# Patient Record
Sex: FEMALE | Race: WHITE | NOT HISPANIC OR LATINO | Employment: FULL TIME | URBAN - METROPOLITAN AREA
[De-identification: names, ages, dates, MRNs, and addresses within clinical notes are randomized per-mention and may not be internally consistent; named-entity substitution may affect disease eponyms.]

---

## 2017-10-11 ENCOUNTER — TRANSCRIBE ORDERS (OUTPATIENT)
Dept: ADMINISTRATIVE | Facility: HOSPITAL | Age: 52
End: 2017-10-11

## 2017-10-11 DIAGNOSIS — Z12.31 VISIT FOR SCREENING MAMMOGRAM: Primary | ICD-10-CM

## 2017-10-31 ENCOUNTER — HOSPITAL ENCOUNTER (OUTPATIENT)
Dept: MAMMOGRAPHY | Facility: HOSPITAL | Age: 52
Discharge: HOME | End: 2017-10-31
Admitting: FAMILY MEDICINE

## 2017-10-31 DIAGNOSIS — Z12.31 VISIT FOR SCREENING MAMMOGRAM: ICD-10-CM

## 2017-10-31 PROCEDURE — 77063 BREAST TOMOSYNTHESIS BI: CPT

## 2017-10-31 PROCEDURE — G0202 SCR MAMMO BI INCL CAD: HCPCS

## 2018-02-06 ENCOUNTER — LAB (OUTPATIENT)
Dept: LAB | Facility: HOSPITAL | Age: 53
End: 2018-02-06

## 2018-02-06 ENCOUNTER — TRANSCRIBE ORDERS (OUTPATIENT)
Dept: FAMILY MEDICINE CLINIC | Facility: CLINIC | Age: 53
End: 2018-02-06

## 2018-02-06 ENCOUNTER — TRANSCRIBE ORDERS (OUTPATIENT)
Dept: ADMINISTRATIVE | Facility: HOSPITAL | Age: 53
End: 2018-02-06

## 2018-02-06 DIAGNOSIS — R31.9 HEMATURIA SYNDROME: ICD-10-CM

## 2018-02-06 DIAGNOSIS — R31.9 HEMATURIA SYNDROME: Primary | ICD-10-CM

## 2018-02-06 DIAGNOSIS — I10 HYPERTENSION, UNSPECIFIED TYPE: ICD-10-CM

## 2018-02-06 DIAGNOSIS — E78.5 HYPERLIPIDEMIA, UNSPECIFIED HYPERLIPIDEMIA TYPE: ICD-10-CM

## 2018-02-06 DIAGNOSIS — R73.9 HYPERGLYCEMIA: ICD-10-CM

## 2018-02-06 LAB
ALBUMIN SERPL-MCNC: 4.3 G/DL (ref 3.5–5.2)
ALBUMIN/GLOB SERPL: 1.7 G/DL
ALP SERPL-CCNC: 59 U/L (ref 39–117)
ALT SERPL W P-5'-P-CCNC: 60 U/L (ref 1–33)
ANION GAP SERPL CALCULATED.3IONS-SCNC: 9.7 MMOL/L
AST SERPL-CCNC: 49 U/L (ref 1–32)
BACTERIA UR QL AUTO: ABNORMAL /HPF
BASOPHILS # BLD AUTO: 0.02 10*3/MM3 (ref 0–0.2)
BASOPHILS NFR BLD AUTO: 0.3 % (ref 0–1.5)
BILIRUB SERPL-MCNC: 1.8 MG/DL (ref 0.1–1.2)
BILIRUB UR QL STRIP: NEGATIVE
BUN BLD-MCNC: 12 MG/DL (ref 6–20)
BUN/CREAT SERPL: 15.6 (ref 7–25)
CALCIUM SPEC-SCNC: 10 MG/DL (ref 8.6–10.5)
CHLORIDE SERPL-SCNC: 96 MMOL/L (ref 98–107)
CHOLEST SERPL-MCNC: 239 MG/DL (ref 0–200)
CLARITY UR: CLEAR
CO2 SERPL-SCNC: 31.3 MMOL/L (ref 22–29)
COLOR UR: ABNORMAL
CREAT BLD-MCNC: 0.77 MG/DL (ref 0.57–1)
DEPRECATED RDW RBC AUTO: 42.5 FL (ref 37–54)
EOSINOPHIL # BLD AUTO: 0.23 10*3/MM3 (ref 0–0.7)
EOSINOPHIL NFR BLD AUTO: 3.6 % (ref 0.3–6.2)
ERYTHROCYTE [DISTWIDTH] IN BLOOD BY AUTOMATED COUNT: 12.9 % (ref 11.7–13)
GFR SERPL CREATININE-BSD FRML MDRD: 78 ML/MIN/1.73
GLOBULIN UR ELPH-MCNC: 2.6 GM/DL
GLUCOSE BLD-MCNC: 373 MG/DL (ref 65–99)
GLUCOSE UR STRIP-MCNC: ABNORMAL MG/DL
HBA1C MFR BLD: 12.6 % (ref 4.8–5.6)
HCT VFR BLD AUTO: 47.2 % (ref 35.6–45.5)
HDLC SERPL-MCNC: 48 MG/DL (ref 40–60)
HGB BLD-MCNC: 16.1 G/DL (ref 11.9–15.5)
HGB UR QL STRIP.AUTO: ABNORMAL
HYALINE CASTS UR QL AUTO: ABNORMAL /LPF
IMM GRANULOCYTES # BLD: 0 10*3/MM3 (ref 0–0.03)
IMM GRANULOCYTES NFR BLD: 0 % (ref 0–0.5)
KETONES UR QL STRIP: ABNORMAL
LDLC SERPL CALC-MCNC: 157 MG/DL (ref 0–100)
LDLC/HDLC SERPL: 3.27 {RATIO}
LEUKOCYTE ESTERASE UR QL STRIP.AUTO: NEGATIVE
LYMPHOCYTES # BLD AUTO: 3.23 10*3/MM3 (ref 0.9–4.8)
LYMPHOCYTES NFR BLD AUTO: 50.3 % (ref 19.6–45.3)
MCH RBC QN AUTO: 30.8 PG (ref 26.9–32)
MCHC RBC AUTO-ENTMCNC: 34.1 G/DL (ref 32.4–36.3)
MCV RBC AUTO: 90.2 FL (ref 80.5–98.2)
MONOCYTES # BLD AUTO: 0.48 10*3/MM3 (ref 0.2–1.2)
MONOCYTES NFR BLD AUTO: 7.5 % (ref 5–12)
NEUTROPHILS # BLD AUTO: 2.46 10*3/MM3 (ref 1.9–8.1)
NEUTROPHILS NFR BLD AUTO: 38.3 % (ref 42.7–76)
NITRITE UR QL STRIP: NEGATIVE
PH UR STRIP.AUTO: 6 [PH] (ref 5–8)
PLATELET # BLD AUTO: 180 10*3/MM3 (ref 140–500)
PMV BLD AUTO: 12.2 FL (ref 6–12)
POTASSIUM BLD-SCNC: 5.4 MMOL/L (ref 3.5–5.2)
PROT SERPL-MCNC: 6.9 G/DL (ref 6–8.5)
PROT UR QL STRIP: NEGATIVE
RBC # BLD AUTO: 5.23 10*6/MM3 (ref 3.9–5.2)
RBC # UR: ABNORMAL /HPF
REF LAB TEST METHOD: ABNORMAL
SODIUM BLD-SCNC: 137 MMOL/L (ref 136–145)
SP GR UR STRIP: >=1.03 (ref 1–1.03)
SQUAMOUS #/AREA URNS HPF: ABNORMAL /HPF
TRIGL SERPL-MCNC: 170 MG/DL (ref 0–150)
TSH SERPL DL<=0.05 MIU/L-ACNC: 2 MIU/ML (ref 0.27–4.2)
UROBILINOGEN UR QL STRIP: ABNORMAL
VLDLC SERPL-MCNC: 34 MG/DL (ref 5–40)
WBC NRBC COR # BLD: 6.42 10*3/MM3 (ref 4.5–10.7)
WBC UR QL AUTO: ABNORMAL /HPF

## 2018-02-06 PROCEDURE — 80061 LIPID PANEL: CPT

## 2018-02-06 PROCEDURE — 83036 HEMOGLOBIN GLYCOSYLATED A1C: CPT

## 2018-02-06 PROCEDURE — 36415 COLL VENOUS BLD VENIPUNCTURE: CPT

## 2018-02-06 PROCEDURE — 87147 CULTURE TYPE IMMUNOLOGIC: CPT

## 2018-02-06 PROCEDURE — 81001 URINALYSIS AUTO W/SCOPE: CPT

## 2018-02-06 PROCEDURE — 85025 COMPLETE CBC W/AUTO DIFF WBC: CPT

## 2018-02-06 PROCEDURE — 87086 URINE CULTURE/COLONY COUNT: CPT

## 2018-02-06 PROCEDURE — 80053 COMPREHEN METABOLIC PANEL: CPT

## 2018-02-06 PROCEDURE — 84443 ASSAY THYROID STIM HORMONE: CPT

## 2018-02-07 LAB
BACTERIA SPEC AEROBE CULT: ABNORMAL
BACTERIA SPEC AEROBE CULT: ABNORMAL
STREP GROUPING: ABNORMAL

## 2018-02-15 ENCOUNTER — TRANSCRIBE ORDERS (OUTPATIENT)
Dept: ADMINISTRATIVE | Facility: HOSPITAL | Age: 53
End: 2018-02-15

## 2018-02-15 DIAGNOSIS — R10.2 PELVIC PAIN IN FEMALE: Primary | ICD-10-CM

## 2018-02-16 ENCOUNTER — HOSPITAL ENCOUNTER (OUTPATIENT)
Dept: CT IMAGING | Facility: HOSPITAL | Age: 53
Discharge: HOME | End: 2018-02-16
Admitting: FAMILY MEDICINE

## 2018-02-16 DIAGNOSIS — R10.2 PELVIC PAIN IN FEMALE: ICD-10-CM

## 2018-02-16 LAB — CREAT BLDA-MCNC: 0.6 MG/DL (ref 0.6–1.3)

## 2018-02-16 PROCEDURE — 74177 CT ABD & PELVIS W/CONTRAST: CPT

## 2018-02-16 PROCEDURE — 0 IOPAMIDOL 61 % SOLUTION: Performed by: FAMILY MEDICINE

## 2018-02-16 PROCEDURE — 82565 ASSAY OF CREATININE: CPT

## 2018-02-16 RX ADMIN — IOPAMIDOL 85 ML: 612 INJECTION, SOLUTION INTRAVENOUS at 07:25

## 2018-02-27 ENCOUNTER — TRANSCRIBE ORDERS (OUTPATIENT)
Dept: FAMILY MEDICINE CLINIC | Facility: CLINIC | Age: 53
End: 2018-02-27

## 2018-02-27 ENCOUNTER — LAB (OUTPATIENT)
Dept: LAB | Facility: HOSPITAL | Age: 53
End: 2018-02-27

## 2018-02-27 DIAGNOSIS — I10 ESSENTIAL HYPERTENSION, BENIGN: Primary | ICD-10-CM

## 2018-02-27 LAB
ANION GAP SERPL CALCULATED.3IONS-SCNC: 10.5 MMOL/L
BUN BLD-MCNC: 17 MG/DL (ref 6–20)
BUN/CREAT SERPL: 23.3 (ref 7–25)
CALCIUM SPEC-SCNC: 9.6 MG/DL (ref 8.6–10.5)
CHLORIDE SERPL-SCNC: 100 MMOL/L (ref 98–107)
CO2 SERPL-SCNC: 30.5 MMOL/L (ref 22–29)
CREAT BLD-MCNC: 0.73 MG/DL (ref 0.57–1)
GFR SERPL CREATININE-BSD FRML MDRD: 83 ML/MIN/1.73
GLUCOSE BLD-MCNC: 148 MG/DL (ref 65–99)
POTASSIUM BLD-SCNC: 3.7 MMOL/L (ref 3.5–5.2)
SODIUM BLD-SCNC: 141 MMOL/L (ref 136–145)

## 2018-02-27 PROCEDURE — 36415 COLL VENOUS BLD VENIPUNCTURE: CPT

## 2018-02-27 PROCEDURE — 80048 BASIC METABOLIC PNL TOTAL CA: CPT

## 2018-10-12 ENCOUNTER — TRANSCRIBE ORDERS (OUTPATIENT)
Dept: ADMINISTRATIVE | Facility: HOSPITAL | Age: 53
End: 2018-10-12

## 2018-10-12 DIAGNOSIS — Z12.31 SCREENING MAMMOGRAM, ENCOUNTER FOR: Primary | ICD-10-CM

## 2018-11-02 ENCOUNTER — HOSPITAL ENCOUNTER (OUTPATIENT)
Dept: MAMMOGRAPHY | Facility: HOSPITAL | Age: 53
Discharge: HOME | End: 2018-11-02
Admitting: FAMILY MEDICINE

## 2018-11-02 DIAGNOSIS — Z12.31 SCREENING MAMMOGRAM, ENCOUNTER FOR: ICD-10-CM

## 2018-11-02 PROCEDURE — 77067 SCR MAMMO BI INCL CAD: CPT

## 2018-11-02 PROCEDURE — 77063 BREAST TOMOSYNTHESIS BI: CPT

## 2019-06-13 ENCOUNTER — CONVERSION ENCOUNTER (OUTPATIENT)
Dept: FAMILY MEDICINE CLINIC | Facility: CLINIC | Age: 54
End: 2019-06-13

## 2019-06-27 ENCOUNTER — OFFICE VISIT (OUTPATIENT)
Dept: FAMILY MEDICINE CLINIC | Facility: CLINIC | Age: 54
End: 2019-06-27

## 2019-06-27 VITALS
DIASTOLIC BLOOD PRESSURE: 79 MMHG | WEIGHT: 293 LBS | BODY MASS INDEX: 41.02 KG/M2 | HEIGHT: 71 IN | RESPIRATION RATE: 18 BRPM | OXYGEN SATURATION: 96 % | SYSTOLIC BLOOD PRESSURE: 142 MMHG | HEART RATE: 74 BPM

## 2019-06-27 DIAGNOSIS — B37.9 ANTIBIOTIC-INDUCED YEAST INFECTION: ICD-10-CM

## 2019-06-27 DIAGNOSIS — N30.00 ACUTE CYSTITIS WITHOUT HEMATURIA: Primary | ICD-10-CM

## 2019-06-27 DIAGNOSIS — T36.95XA ANTIBIOTIC-INDUCED YEAST INFECTION: ICD-10-CM

## 2019-06-27 LAB
BILIRUB BLD-MCNC: NEGATIVE MG/DL
CLARITY, POC: CLEAR
COLOR UR: YELLOW
GLUCOSE UR STRIP-MCNC: NEGATIVE MG/DL
KETONES UR QL: NEGATIVE
LEUKOCYTE EST, POC: ABNORMAL
NITRITE UR-MCNC: NEGATIVE MG/ML
PH UR: 7 [PH] (ref 5–8)
PROT UR STRIP-MCNC: ABNORMAL MG/DL
RBC # UR STRIP: ABNORMAL /UL
SP GR UR: 1 (ref 1–1.03)
UROBILINOGEN UR QL: NORMAL

## 2019-06-27 PROCEDURE — 99213 OFFICE O/P EST LOW 20 MIN: CPT | Performed by: FAMILY MEDICINE

## 2019-06-27 PROCEDURE — 81002 URINALYSIS NONAUTO W/O SCOPE: CPT | Performed by: FAMILY MEDICINE

## 2019-06-27 RX ORDER — FLUCONAZOLE 100 MG/1
100 TABLET ORAL ONCE
Qty: 3 TABLET | Refills: 1 | Status: SHIPPED | OUTPATIENT
Start: 2019-06-27 | End: 2019-09-09 | Stop reason: SDUPTHER

## 2019-06-27 RX ORDER — SULFAMETHOXAZOLE AND TRIMETHOPRIM 800; 160 MG/1; MG/1
1 TABLET ORAL 2 TIMES DAILY
Qty: 14 TABLET | Refills: 0 | Status: SHIPPED | OUTPATIENT
Start: 2019-06-27 | End: 2019-09-09 | Stop reason: SDUPTHER

## 2019-06-27 RX ORDER — METHENAMINE, SODIUM PHOSPHATE, MONOBASIC, ANHYDROUS, PHENYL SALICYLATE, METHYLENE BLUE AND HYOSCYAMINE SULFATE 118; 40.8; 36; 10; .12 MG/1; MG/1; MG/1; MG/1; MG/1
1 CAPSULE ORAL 4 TIMES DAILY PRN
Qty: 28 CAPSULE | Refills: 0 | Status: SHIPPED | OUTPATIENT
Start: 2019-06-27 | End: 2020-03-30

## 2019-06-27 NOTE — PROGRESS NOTES
"Chief Complaint   Patient presents with   • Urinary Tract Infection       Subjective   Sandra Lopez is a 54 y.o. female.     Urinary Tract Infection    This is a new problem. The current episode started in the past 7 days. The problem has been unchanged. The quality of the pain is described as aching. There has been no fever. Associated symptoms include frequency. Pertinent negatives include no chills, hematuria, nausea or vomiting.        I have reviewed and updated her medications, medical history and problem list during today's office visit.     Social History     Tobacco Use   • Smoking status: Not on file   Substance Use Topics   • Alcohol use: Not on file       Review of Systems   Constitutional: Negative for appetite change, chills, fatigue and fever.   Respiratory: Negative for shortness of breath.    Gastrointestinal: Negative for abdominal pain, diarrhea, nausea and vomiting.   Endocrine: Negative for polydipsia.   Genitourinary: Positive for dysuria and frequency. Negative for hematuria, pelvic pain and vaginal discharge.   Musculoskeletal: Negative for back pain.   Neurological: Negative for dizziness.       Objective   Vitals:    06/27/19 1727   BP: 142/79   BP Location: Left arm   Patient Position: Sitting   Cuff Size: Large Adult   Pulse: 74   Resp: 18   SpO2: 96%   Weight: (!) 164 kg (360 lb 9.6 oz)   Height: 180.3 cm (71\")     Body mass index is 50.29 kg/m².  Physical Exam   HENT:   Head: Normocephalic and atraumatic.   Mouth/Throat: Oropharynx is clear and moist.   Eyes: Conjunctivae and EOM are normal. Pupils are equal, round, and reactive to light.   Neck: Normal range of motion. Neck supple. No edema present.   Cardiovascular: Normal rate, regular rhythm and normal heart sounds.   Pulmonary/Chest: Effort normal and breath sounds normal.   Musculoskeletal: She exhibits no edema.   Neurological: She is alert. No cranial nerve deficit.   Skin: No rash noted.   Psychiatric: She has a normal " mood and affect. Thought content normal.          Brief Urine Lab Results  (Last result in the past 365 days)      Color   Clarity   Blood   Leuk Est   Nitrite   Protein   CREAT   Urine HCG        06/27/19 1741 Yellow Clear Trace Moderate (2+) Negative Trace                   Assessment/Plan       Diagnoses and all orders for this visit:    1. Acute cystitis without hematuria (Primary)  -     POCT urinalysis dipstick, manual  -     Urine Culture - Urine, Urine, Clean Catch  -     sulfamethoxazole-trimethoprim (BACTRIM DS) 800-160 MG per tablet; Take 1 tablet by mouth 2 (Two) Times a Day for 7 days.  Dispense: 14 tablet; Refill: 0  -     Meth-Hyo-M Bl-Na Phos-Ph Sal (URO-MP) 118 MG capsule; Take 1 tablet by mouth 4 (Four) Times a Day As Needed (burning with urination).  Dispense: 28 capsule; Refill: 0    2. Antibiotic-induced yeast infection  -     fluconazole (DIFLUCAN) 100 MG tablet; Take 1 tablet by mouth 1 (One) Time for 1 dose.  Dispense: 3 tablet; Refill: 1          Return if symptoms worsen or fail to improve.

## 2019-06-30 LAB
BACTERIA UR CULT: ABNORMAL
BACTERIA UR CULT: ABNORMAL
OTHER ANTIBIOTIC SUSC ISLT: ABNORMAL

## 2019-07-22 VITALS
HEIGHT: 71 IN | DIASTOLIC BLOOD PRESSURE: 90 MMHG | HEART RATE: 91 BPM | RESPIRATION RATE: 17 BRPM | SYSTOLIC BLOOD PRESSURE: 138 MMHG | WEIGHT: 293 LBS | BODY MASS INDEX: 41.02 KG/M2 | OXYGEN SATURATION: 97 %

## 2019-09-09 ENCOUNTER — TELEPHONE (OUTPATIENT)
Dept: FAMILY MEDICINE CLINIC | Facility: CLINIC | Age: 54
End: 2019-09-09

## 2019-09-09 DIAGNOSIS — T36.95XA ANTIBIOTIC-INDUCED YEAST INFECTION: ICD-10-CM

## 2019-09-09 DIAGNOSIS — B37.9 ANTIBIOTIC-INDUCED YEAST INFECTION: ICD-10-CM

## 2019-09-09 DIAGNOSIS — N30.00 ACUTE CYSTITIS WITHOUT HEMATURIA: ICD-10-CM

## 2019-09-09 RX ORDER — FLUCONAZOLE 150 MG/1
150 TABLET ORAL ONCE
Qty: 1 TABLET | Refills: 2 | Status: SHIPPED | OUTPATIENT
Start: 2019-09-09 | End: 2019-09-09

## 2019-09-09 RX ORDER — SULFAMETHOXAZOLE AND TRIMETHOPRIM 800; 160 MG/1; MG/1
1 TABLET ORAL 2 TIMES DAILY
Qty: 14 TABLET | Refills: 0 | Status: SHIPPED | OUTPATIENT
Start: 2019-09-09 | End: 2020-03-30

## 2019-10-16 ENCOUNTER — TRANSCRIBE ORDERS (OUTPATIENT)
Dept: ADMINISTRATIVE | Facility: HOSPITAL | Age: 54
End: 2019-10-16

## 2019-10-16 DIAGNOSIS — Z12.31 SCREENING MAMMOGRAM, ENCOUNTER FOR: Primary | ICD-10-CM

## 2019-10-23 RX ORDER — BLOOD SUGAR DIAGNOSTIC
STRIP MISCELLANEOUS
Qty: 200 EACH | Refills: 5 | Status: SHIPPED | OUTPATIENT
Start: 2019-10-23 | End: 2022-03-05 | Stop reason: SDUPTHER

## 2019-10-23 RX ORDER — LANCETS 28 GAUGE
EACH MISCELLANEOUS
Qty: 200 EACH | Refills: 5 | Status: SHIPPED | OUTPATIENT
Start: 2019-10-23

## 2019-11-12 ENCOUNTER — HOSPITAL ENCOUNTER (OUTPATIENT)
Dept: MAMMOGRAPHY | Facility: HOSPITAL | Age: 54
Discharge: HOME | End: 2019-11-12
Admitting: FAMILY MEDICINE

## 2019-11-12 DIAGNOSIS — Z12.31 SCREENING MAMMOGRAM, ENCOUNTER FOR: ICD-10-CM

## 2019-11-12 PROCEDURE — 77067 SCR MAMMO BI INCL CAD: CPT

## 2019-11-12 PROCEDURE — 77063 BREAST TOMOSYNTHESIS BI: CPT

## 2020-03-30 DIAGNOSIS — N30.00 ACUTE CYSTITIS WITHOUT HEMATURIA: ICD-10-CM

## 2020-03-30 RX ORDER — METHENAMINE, SODIUM PHOSPHATE, MONOBASIC, MONOHYDRATE, PHENYL SALICYLATE, METHYLENE BLUE, AND HYOSCYAMINE SULFATE 120; 40.8; 36; 10; .12 MG/1; MG/1; MG/1; MG/1; MG/1
1 CAPSULE ORAL 4 TIMES DAILY PRN
Qty: 28 CAPSULE | Refills: 0 | Status: SHIPPED | OUTPATIENT
Start: 2020-03-30 | End: 2021-06-03

## 2020-03-30 RX ORDER — SULFAMETHOXAZOLE AND TRIMETHOPRIM 800; 160 MG/1; MG/1
TABLET ORAL
Qty: 14 TABLET | Refills: 0 | Status: SHIPPED | OUTPATIENT
Start: 2020-03-30 | End: 2021-06-03

## 2020-04-10 DIAGNOSIS — I10 ESSENTIAL (PRIMARY) HYPERTENSION: ICD-10-CM

## 2020-04-10 RX ORDER — QUINAPRIL 10 MG/1
10 TABLET ORAL DAILY
Qty: 90 TABLET | Refills: 3 | Status: SHIPPED | OUTPATIENT
Start: 2020-04-10 | End: 2021-05-10 | Stop reason: SDUPTHER

## 2020-04-10 RX ORDER — MELOXICAM 15 MG/1
15 TABLET ORAL DAILY
Qty: 90 TABLET | Refills: 3 | Status: SHIPPED | OUTPATIENT
Start: 2020-04-10 | End: 2021-05-10 | Stop reason: SDUPTHER

## 2020-04-10 RX ORDER — HYDROCHLOROTHIAZIDE 25 MG/1
25 TABLET ORAL DAILY
Qty: 90 TABLET | Refills: 3 | Status: SHIPPED | OUTPATIENT
Start: 2020-04-10 | End: 2021-05-10 | Stop reason: SDUPTHER

## 2020-04-10 RX ORDER — METFORMIN HYDROCHLORIDE 500 MG/1
500 TABLET, EXTENDED RELEASE ORAL DAILY
Qty: 90 TABLET | Refills: 3 | Status: SHIPPED | OUTPATIENT
Start: 2020-04-10 | End: 2021-05-10 | Stop reason: SDUPTHER

## 2020-11-13 ENCOUNTER — HOSPITAL ENCOUNTER (OUTPATIENT)
Dept: MAMMOGRAPHY | Facility: HOSPITAL | Age: 55
Discharge: HOME | End: 2020-11-13
Admitting: FAMILY MEDICINE

## 2020-11-13 DIAGNOSIS — Z12.31 VISIT FOR SCREENING MAMMOGRAM: ICD-10-CM

## 2020-11-13 PROCEDURE — 77063 BREAST TOMOSYNTHESIS BI: CPT

## 2020-11-13 PROCEDURE — 77067 SCR MAMMO BI INCL CAD: CPT

## 2020-11-21 PROBLEM — M17.11 OSTEOARTHRITIS OF RIGHT KNEE: Status: ACTIVE | Noted: 2020-11-21

## 2020-11-21 PROBLEM — Z86.19 HISTORY OF CHICKEN POX: Status: ACTIVE | Noted: 2020-11-21

## 2020-11-21 PROBLEM — E78.5 HYPERLIPIDEMIA: Status: ACTIVE | Noted: 2020-11-21

## 2020-11-21 PROBLEM — Z78.0 POSTMENOPAUSAL: Status: ACTIVE | Noted: 2020-11-21

## 2020-11-21 PROBLEM — R73.9 HYPERGLYCEMIA: Status: ACTIVE | Noted: 2020-11-21

## 2020-11-21 PROBLEM — I10 HYPERTENSION, BENIGN: Status: ACTIVE | Noted: 2020-11-21

## 2020-11-21 PROBLEM — R74.8 ABNORMAL LIVER ENZYMES: Status: ACTIVE | Noted: 2020-11-21

## 2020-11-21 PROBLEM — N95.1 MENOPAUSE SYNDROME: Status: ACTIVE | Noted: 2020-11-21

## 2020-11-21 RX ORDER — OMEPRAZOLE 20 MG/1
20 TABLET, DELAYED RELEASE ORAL DAILY
Qty: 90 TABLET | Refills: 3 | Status: SHIPPED | OUTPATIENT
Start: 2020-11-21 | End: 2021-06-03 | Stop reason: SDUPTHER

## 2021-01-04 ENCOUNTER — IMMUNIZATION (OUTPATIENT)
Dept: VACCINE CLINIC | Facility: HOSPITAL | Age: 56
End: 2021-01-04

## 2021-01-04 PROCEDURE — 0001A: CPT | Performed by: INTERNAL MEDICINE

## 2021-01-04 PROCEDURE — 91300 HC SARSCOV02 VAC 30MCG/0.3ML IM: CPT | Performed by: INTERNAL MEDICINE

## 2021-01-25 ENCOUNTER — IMMUNIZATION (OUTPATIENT)
Dept: VACCINE CLINIC | Facility: HOSPITAL | Age: 56
End: 2021-01-25

## 2021-01-25 PROCEDURE — 91300 HC SARSCOV02 VAC 30MCG/0.3ML IM: CPT | Performed by: INTERNAL MEDICINE

## 2021-01-25 PROCEDURE — 0002A: CPT | Performed by: INTERNAL MEDICINE

## 2021-05-10 DIAGNOSIS — I10 ESSENTIAL (PRIMARY) HYPERTENSION: ICD-10-CM

## 2021-05-11 NOTE — TELEPHONE ENCOUNTER
Last seen 6/27/2019 and does not have any upcoming  visits scheduled. Last a1c was 2/6/2018 and was 12.6.

## 2021-05-12 RX ORDER — QUINAPRIL 10 MG/1
10 TABLET ORAL DAILY
Qty: 30 TABLET | Refills: 0 | Status: SHIPPED | OUTPATIENT
Start: 2021-05-12 | End: 2021-06-03 | Stop reason: SDUPTHER

## 2021-05-12 RX ORDER — HYDROCHLOROTHIAZIDE 25 MG/1
25 TABLET ORAL DAILY
Qty: 30 TABLET | Refills: 0 | Status: SHIPPED | OUTPATIENT
Start: 2021-05-12 | End: 2021-06-03 | Stop reason: SDUPTHER

## 2021-05-12 RX ORDER — MELOXICAM 15 MG/1
15 TABLET ORAL DAILY
Qty: 30 TABLET | Refills: 0 | Status: SHIPPED | OUTPATIENT
Start: 2021-05-12 | End: 2021-06-07 | Stop reason: SDUPTHER

## 2021-05-12 RX ORDER — OMEPRAZOLE 20 MG/1
20 TABLET, DELAYED RELEASE ORAL DAILY
Qty: 90 TABLET | Refills: 3 | OUTPATIENT
Start: 2021-05-12

## 2021-05-12 RX ORDER — METFORMIN HYDROCHLORIDE 500 MG/1
500 TABLET, EXTENDED RELEASE ORAL DAILY
Qty: 30 TABLET | Refills: 0 | Status: SHIPPED | OUTPATIENT
Start: 2021-05-12 | End: 2021-06-03 | Stop reason: SDUPTHER

## 2021-06-03 ENCOUNTER — OFFICE VISIT (OUTPATIENT)
Dept: FAMILY MEDICINE CLINIC | Facility: CLINIC | Age: 56
End: 2021-06-03

## 2021-06-03 VITALS
OXYGEN SATURATION: 96 % | DIASTOLIC BLOOD PRESSURE: 80 MMHG | SYSTOLIC BLOOD PRESSURE: 137 MMHG | WEIGHT: 293 LBS | HEART RATE: 111 BPM | TEMPERATURE: 97.3 F | BODY MASS INDEX: 47.09 KG/M2 | HEIGHT: 66 IN

## 2021-06-03 DIAGNOSIS — Z11.59 NEED FOR HEPATITIS C SCREENING TEST: ICD-10-CM

## 2021-06-03 DIAGNOSIS — I10 ESSENTIAL (PRIMARY) HYPERTENSION: ICD-10-CM

## 2021-06-03 DIAGNOSIS — E11.9 TYPE 2 DIABETES MELLITUS WITHOUT COMPLICATION, WITHOUT LONG-TERM CURRENT USE OF INSULIN (HCC): ICD-10-CM

## 2021-06-03 DIAGNOSIS — K21.9 GASTROESOPHAGEAL REFLUX DISEASE WITHOUT ESOPHAGITIS: ICD-10-CM

## 2021-06-03 DIAGNOSIS — E66.01 MORBID OBESITY (HCC): ICD-10-CM

## 2021-06-03 DIAGNOSIS — R20.0 NUMBNESS OF RIGHT FOOT: ICD-10-CM

## 2021-06-03 DIAGNOSIS — R74.8 ABNORMAL LIVER ENZYMES: ICD-10-CM

## 2021-06-03 DIAGNOSIS — Z00.01 ENCOUNTER FOR ANNUAL GENERAL MEDICAL EXAMINATION WITH ABNORMAL FINDINGS IN ADULT: Primary | ICD-10-CM

## 2021-06-03 DIAGNOSIS — E78.00 PURE HYPERCHOLESTEROLEMIA: ICD-10-CM

## 2021-06-03 PROCEDURE — 99214 OFFICE O/P EST MOD 30 MIN: CPT | Performed by: FAMILY MEDICINE

## 2021-06-03 PROCEDURE — 99396 PREV VISIT EST AGE 40-64: CPT | Performed by: FAMILY MEDICINE

## 2021-06-03 RX ORDER — HYDROCHLOROTHIAZIDE 25 MG/1
25 TABLET ORAL DAILY
Qty: 90 TABLET | Refills: 3 | Status: SHIPPED | OUTPATIENT
Start: 2021-06-03 | End: 2022-06-07 | Stop reason: SDUPTHER

## 2021-06-03 RX ORDER — GLIPIZIDE 5 MG/1
5 TABLET, FILM COATED, EXTENDED RELEASE ORAL DAILY PRN
Qty: 90 TABLET | Refills: 3 | Status: SHIPPED | OUTPATIENT
Start: 2021-06-03 | End: 2022-06-07 | Stop reason: SDUPTHER

## 2021-06-03 RX ORDER — GLIPIZIDE 5 MG/1
TABLET, FILM COATED, EXTENDED RELEASE ORAL
COMMUNITY
Start: 2018-02-08 | End: 2021-06-03 | Stop reason: SDUPTHER

## 2021-06-03 RX ORDER — OMEPRAZOLE 20 MG/1
20 TABLET, DELAYED RELEASE ORAL DAILY
Qty: 90 TABLET | Refills: 3 | Status: SHIPPED | OUTPATIENT
Start: 2021-06-03 | End: 2021-06-15

## 2021-06-03 RX ORDER — QUINAPRIL 10 MG/1
10 TABLET ORAL DAILY
Qty: 90 TABLET | Refills: 3 | Status: SHIPPED | OUTPATIENT
Start: 2021-06-03 | End: 2022-06-07 | Stop reason: SDUPTHER

## 2021-06-03 RX ORDER — BLOOD-GLUCOSE METER
1 KIT MISCELLANEOUS
COMMUNITY
Start: 2018-04-12

## 2021-06-03 RX ORDER — METFORMIN HYDROCHLORIDE 500 MG/1
500 TABLET, EXTENDED RELEASE ORAL DAILY
Qty: 90 TABLET | Refills: 3 | Status: SHIPPED | OUTPATIENT
Start: 2021-06-03 | End: 2022-06-07 | Stop reason: SDUPTHER

## 2021-06-03 NOTE — PROGRESS NOTES
Chief Complaint   Patient presents with   • Annual Exam   • Hypertension   • Hyperlipidemia   • Blood Sugar Problem     Subjective   Sandra Lopez is a 56 y.o. female here for her annual/wellness visit with me. Sandra is here for coordination of medical care, to discuss health maintenance, disease prevention as well as to followup on medical problems. Patient's last CPE was 05/19/2016. Activity level is moderate. Exercises 0 per week. Appetite is good. Feels well with few complaints. Energy level is good. Sleeps well. Patient's last colonoscopy was N/A. Patient's last mammogram was 11/13/2020 (BIRAD 1, mostly fatty breasts). Patient is doing routine self skin exam monthly. Patient is doing routine self-breast exams monthly.  She is s/p hysterectomy and oophorectomy for non-cancerous conditions.    Patient is also here for f/u on chronic medical conditions.  Last visit with the office >2 years ago (4/2019).    Hypertension  This is a chronic problem. The current episode started more than 1 year ago. The problem is unchanged. Condition status: borderline. Pertinent negatives include no blurred vision, chest pain, palpitations, peripheral edema or shortness of breath. Agents associated with hypertension include NSAIDs. Risk factors for coronary artery disease include dyslipidemia, diabetes mellitus, obesity and post-menopausal state. Current antihypertension treatment includes ACE inhibitors and diuretics. Compliance problems include exercise.  There is no history of kidney disease, CAD/MI, CVA or heart failure. There is no history of chronic renal disease or sleep apnea (known known history).   Hyperlipidemia  This is a chronic problem. The current episode started more than 1 year ago. Lipid results: unknown, lipids due. Exacerbating diseases include liver disease and obesity. She has no history of chronic renal disease. Pertinent negatives include no chest pain or shortness of breath. Current antihyperlipidemic  treatment includes statins. Compliance problems include adherence to diet and adherence to exercise (infrequent office visits).  Risk factors for coronary artery disease include diabetes mellitus, dyslipidemia, obesity, hypertension and post-menopausal.   Diabetes  She presents for her follow-up diabetic visit. She has type 2 diabetes mellitus. Disease course: unknown. There are no hypoglycemic associated symptoms. Pertinent negatives for hypoglycemia include no seizures. Associated symptoms include foot paresthesias. Pertinent negatives for diabetes include no blurred vision, no chest pain, no foot ulcerations, no polydipsia and no polyuria. There are no hypoglycemic complications. Pertinent negatives for diabetic complications include no CVA, heart disease or nephropathy. Risk factors for coronary artery disease include diabetes mellitus, dyslipidemia, obesity, hypertension and post-menopausal. Current diabetic treatment includes oral agent (dual therapy). When asked about meal planning, she reported none. She has not had a previous visit with a dietitian. She never participates in exercise. Blood glucose monitoring compliance: unknown. An ACE inhibitor/angiotensin II receptor blocker is being taken. She does not see a podiatrist.Eye exam current: unknown.   Last A1c >12 (2018).      The following portions of the patient's history were reviewed and updated as appropriate: allergies, current medications, past family history, past medical history, past social history, past surgical history and problem list.      Past Medical History :  Active Ambulatory Problems     Diagnosis Date Noted   • Postmenopausal 11/21/2020   • Osteoarthritis of right knee 11/21/2020   • Menopause syndrome 11/21/2020   • Hypertension, benign 11/21/2020   • Hyperlipidemia 11/21/2020   • Hyperglycemia 11/21/2020   • History of chicken pox 11/21/2020   • Abnormal liver enzymes 11/21/2020   • Morbid obesity (CMS/HCC) 06/03/2021     Resolved  "Ambulatory Problems     Diagnosis Date Noted   • No Resolved Ambulatory Problems     No Additional Past Medical History       Medication List:    Current Outpatient Medications:   •  glipizide (GLUCOTROL XL) 5 MG ER tablet, Take  by mouth., Disp: , Rfl:   •  hydroCHLOROthiazide (HYDRODIURIL) 25 MG tablet, Take 1 tablet by mouth Daily, Disp: 30 tablet, Rfl: 0  •  meloxicam (MOBIC) 15 MG tablet, Take 1 tablet by mouth Daily, Disp: 30 tablet, Rfl: 0  •  metFORMIN ER (GLUCOPHAGE-XR) 500 MG 24 hr tablet, Take 1 tablet by mouth Daily, Disp: 30 tablet, Rfl: 0  •  omeprazole OTC (PriLOSEC OTC) 20 MG EC tablet, Take 1 tablet by mouth Daily., Disp: 90 tablet, Rfl: 3  •  quinapril (ACCUPRIL) 10 MG tablet, Take 1 tablet by mouth daily, Disp: 30 tablet, Rfl: 0    Allergies:  Allergies   Allergen Reactions   • Aspartame Headache   • Compazine [Prochlorperazine Edisylate] Other (See Comments)     IV causes her to be \"ice cold\"   • Prochlorperazine Other (See Comments)     Body felt ice cold       Social History:  Social History     Socioeconomic History   • Marital status:      Spouse name: Not on file   • Number of children: Not on file   • Years of education: Not on file   • Highest education level: Not on file   Tobacco Use   • Smoking status: Never Smoker   • Smokeless tobacco: Never Used   Substance and Sexual Activity   • Alcohol use: Yes     Comment: occ   • Drug use: Never   • Sexual activity: Not Currently       Family History:  History reviewed. No pertinent family history.    Past Surgical History:  Past Surgical History:   Procedure Laterality Date   • BREAST BIOPSY     • HYSTERECTOMY     • OOPHORECTOMY         PHQ-2 Depression Screening  Little interest or pleasure in doing things? 0   Feeling down, depressed, or hopeless? 0   PHQ-2 Total Score 0     Health Maintenance   Topic Date Due   • COLORECTAL CANCER SCREENING  Never done   • TDAP/TD VACCINES (1 - Tdap) Never done   • ZOSTER VACCINE (1 of 2) Never done " "  • INFLUENZA VACCINE  08/01/2021   • ANNUAL PHYSICAL  06/04/2022   • LIPID PANEL  06/07/2022   • MAMMOGRAM  11/13/2022   • HEPATITIS C SCREENING  Completed   • COVID-19 Vaccine  Completed   • Pneumococcal Vaccine 0-64  Aged Out     Immunization History   Administered Date(s) Administered   • COVID-19 (PFIZER) 01/04/2021, 01/25/2021   • Hepatitis A 04/19/2018, 11/06/2018   • Influenza, Unspecified 10/20/2018, 10/17/2019, 10/02/2020         Review Of Systems:  Review of Systems   HENT: Negative for ear pain.    Eyes: Negative for blurred vision, double vision and visual disturbance.   Respiratory: Negative for shortness of breath.    Cardiovascular: Negative for chest pain, palpitations and leg swelling.   Gastrointestinal: Positive for GERD (patient continues to benefit from chronic daily PPI use). Negative for blood in stool, constipation and diarrhea.   Endocrine: Negative for polydipsia and polyuria.   Genitourinary: Negative for breast discharge, breast lump, breast pain, dysuria, hematuria, pelvic pain and vaginal bleeding.   Neurological: Negative for seizures, syncope and headache.   Psychiatric/Behavioral: Negative for behavioral problems and depressed mood.         Physical Exam:  Vital Signs:  /80   Pulse 111   Temp 97.3 °F (36.3 °C) (Temporal)   Ht 167.6 cm (66\")   Wt (!) 163 kg (359 lb 9.6 oz)   SpO2 96%   BMI 58.04 kg/m²     Physical Exam  Constitutional:       General: She is not in acute distress.     Appearance: Normal appearance. She is well-developed. She is obese. She is not ill-appearing.   HENT:      Head: Normocephalic and atraumatic.      Right Ear: Tympanic membrane, ear canal and external ear normal.      Left Ear: Tympanic membrane, ear canal and external ear normal.      Nose: Nose normal.      Mouth/Throat:      Mouth: Mucous membranes are moist.      Pharynx: No posterior oropharyngeal erythema.   Eyes:      General: No scleral icterus.        Right eye: No discharge.         " Left eye: No discharge.      Extraocular Movements: Extraocular movements intact.      Conjunctiva/sclera: Conjunctivae normal.      Pupils: Pupils are equal, round, and reactive to light.   Neck:      Thyroid: No thyromegaly.      Vascular: No carotid bruit or JVD.   Cardiovascular:      Rate and Rhythm: Normal rate and regular rhythm.      Pulses: Normal pulses.      Heart sounds: Normal heart sounds. No murmur heard.     Pulmonary:      Effort: Pulmonary effort is normal.      Breath sounds: Normal breath sounds. No wheezing or rales.   Abdominal:      General: There is no distension.      Palpations: Abdomen is soft.      Tenderness: There is no abdominal tenderness. There is no guarding or rebound.   Genitourinary:     Comments: Exam not performed- patient declines breast and pelvic exam today  Musculoskeletal:         General: No tenderness. Normal range of motion.      Cervical back: Normal range of motion and neck supple. No edema.   Lymphadenopathy:      Cervical: No cervical adenopathy.   Skin:     General: Skin is warm.      Capillary Refill: Capillary refill takes less than 2 seconds.      Findings: No erythema, lesion or rash.   Neurological:      General: No focal deficit present.      Mental Status: She is alert and oriented to person, place, and time. Mental status is at baseline.      Cranial Nerves: No cranial nerve deficit.      Motor: No abnormal muscle tone.      Coordination: Coordination normal.   Psychiatric:         Mood and Affect: Mood normal.         Behavior: Behavior normal.         Thought Content: Thought content normal.         Judgment: Judgment normal.       Brief Urine Lab Results  (Last result in the past 365 days)      Color   Clarity   Blood   Leuk Est   Nitrite   Protein   CREAT   Urine HCG        06/07/21 1005 Yellow Clear Negative Small (1+) Negative Negative             Lab Results   Component Value Date    HGBA1C 12.60 (H) 02/06/2018    HGBA1C 6.50 (H) 11/02/2016    HGBA1C  6.50 11/02/2016         Assessment and Plan:  Diagnoses and all orders for this visit:    1. Encounter for annual general medical examination with abnormal findings in adult (Primary)    2. Essential (primary) hypertension  Comments:  Borderline.  Continue current antihypertensive medications (quinapril, HCTZ).    Orders:  -     quinapril (ACCUPRIL) 10 MG tablet; Take 1 tablet by mouth Daily.  Dispense: 90 tablet; Refill: 3  -     hydroCHLOROthiazide (HYDRODIURIL) 25 MG tablet; Take 1 tablet by mouth Daily  Dispense: 90 tablet; Refill: 3  -     Comprehensive Metabolic Panel  -     TSH  -     CBC & Differential  -     Urinalysis without microscopic (no culture) - Urine, Clean Catch    3. Type 2 diabetes mellitus without complication, without long-term current use of insulin (CMS/[facility])  Comments:  Continue current medications.  Fasting labs ordered.  Orders:  -     metFORMIN ER (GLUCOPHAGE-XR) 500 MG 24 hr tablet; Take 1 tablet by mouth Daily  Dispense: 90 tablet; Refill: 3  -     glipizide (GLUCOTROL XL) 5 MG ER tablet; Take 1 tablet by mouth Daily As Needed (blood sugar over 160).  Dispense: 90 tablet; Refill: 3  -     Hemoglobin A1c    4. Pure hypercholesterolemia  Comments:  Fasting labs ordered.  Continue atorvastatin.  Orders:  -     Lipid Panel    5. Need for hepatitis C screening test  -     Hepatitis C antibody    6. Numbness of right foot  -     Vitamin B12    7. Gastroesophageal reflux disease without esophagitis  -     omeprazole OTC (PriLOSEC OTC) 20 MG EC tablet; Take 1 tablet by mouth Daily.  Dispense: 90 tablet; Refill: 3    8. Abnormal liver enzymes  Comments:  Recheck labs.    9. Morbid obesity (CMS/[facility])    10. BMI 50.0-59.9, adult (CMS/[facility])      Discussed screening for common diseases.  Encouraged self-breast exams, clinical breast exams, and discussed timing of mammograms.   Also discussed monitoring of blood pressure, lipids, blood sugars.  Patient advised of upcoming vaccinations: Tdap,  Shingrix.    Mammography current.  Colon cancer screening options discussed, including Colonoscopy, Cologuard and Hemoccult cards. She will consider options.    I wore protective equipment throughout this patient encounter to include mask. Hand hygiene was performed before donning protective equipment and after removal when leaving the room.  Patient also wore a mask.

## 2021-06-07 ENCOUNTER — LAB (OUTPATIENT)
Dept: LAB | Facility: HOSPITAL | Age: 56
End: 2021-06-07

## 2021-06-07 DIAGNOSIS — E78.2 MIXED HYPERLIPIDEMIA: Primary | ICD-10-CM

## 2021-06-07 DIAGNOSIS — I10 ESSENTIAL (PRIMARY) HYPERTENSION: ICD-10-CM

## 2021-06-07 LAB
ALBUMIN SERPL-MCNC: 4.3 G/DL (ref 3.5–5.2)
ALBUMIN/GLOB SERPL: 2 G/DL
ALP SERPL-CCNC: 58 U/L (ref 39–117)
ALT SERPL W P-5'-P-CCNC: 71 U/L (ref 1–33)
ANION GAP SERPL CALCULATED.3IONS-SCNC: 9.2 MMOL/L (ref 5–15)
AST SERPL-CCNC: 43 U/L (ref 1–32)
BASOPHILS # BLD AUTO: 0.03 10*3/MM3 (ref 0–0.2)
BASOPHILS NFR BLD AUTO: 0.5 % (ref 0–1.5)
BILIRUB SERPL-MCNC: 1.9 MG/DL (ref 0–1.2)
BILIRUB UR QL STRIP: NEGATIVE
BUN SERPL-MCNC: 20 MG/DL (ref 6–20)
BUN/CREAT SERPL: 25.3 (ref 7–25)
CALCIUM SPEC-SCNC: 10 MG/DL (ref 8.6–10.5)
CHLORIDE SERPL-SCNC: 102 MMOL/L (ref 98–107)
CHOLEST SERPL-MCNC: 218 MG/DL (ref 0–200)
CLARITY UR: CLEAR
CO2 SERPL-SCNC: 29.8 MMOL/L (ref 22–29)
COLOR UR: YELLOW
CREAT SERPL-MCNC: 0.79 MG/DL (ref 0.57–1)
DEPRECATED RDW RBC AUTO: 39.1 FL (ref 37–54)
EOSINOPHIL # BLD AUTO: 0.11 10*3/MM3 (ref 0–0.4)
EOSINOPHIL NFR BLD AUTO: 1.7 % (ref 0.3–6.2)
ERYTHROCYTE [DISTWIDTH] IN BLOOD BY AUTOMATED COUNT: 12.6 % (ref 12.3–15.4)
GFR SERPL CREATININE-BSD FRML MDRD: 75 ML/MIN/1.73
GLOBULIN UR ELPH-MCNC: 2.2 GM/DL
GLUCOSE SERPL-MCNC: 159 MG/DL (ref 65–99)
GLUCOSE UR STRIP-MCNC: NEGATIVE MG/DL
HBA1C MFR BLD: 6.8 % (ref 4.8–5.6)
HCT VFR BLD AUTO: 44.8 % (ref 34–46.6)
HCV AB SER DONR QL: NORMAL
HDLC SERPL-MCNC: 57 MG/DL (ref 40–60)
HGB BLD-MCNC: 15.5 G/DL (ref 12–15.9)
HGB UR QL STRIP.AUTO: NEGATIVE
IMM GRANULOCYTES # BLD AUTO: 0.01 10*3/MM3 (ref 0–0.05)
IMM GRANULOCYTES NFR BLD AUTO: 0.2 % (ref 0–0.5)
KETONES UR QL STRIP: NEGATIVE
LDLC SERPL CALC-MCNC: 143 MG/DL (ref 0–100)
LDLC/HDLC SERPL: 2.47 {RATIO}
LEUKOCYTE ESTERASE UR QL STRIP.AUTO: ABNORMAL
LYMPHOCYTES # BLD AUTO: 3.4 10*3/MM3 (ref 0.7–3.1)
LYMPHOCYTES NFR BLD AUTO: 53.7 % (ref 19.6–45.3)
MCH RBC QN AUTO: 30 PG (ref 26.6–33)
MCHC RBC AUTO-ENTMCNC: 34.6 G/DL (ref 31.5–35.7)
MCV RBC AUTO: 86.7 FL (ref 79–97)
MONOCYTES # BLD AUTO: 0.58 10*3/MM3 (ref 0.1–0.9)
MONOCYTES NFR BLD AUTO: 9.2 % (ref 5–12)
NEUTROPHILS NFR BLD AUTO: 2.2 10*3/MM3 (ref 1.7–7)
NEUTROPHILS NFR BLD AUTO: 34.7 % (ref 42.7–76)
NITRITE UR QL STRIP: NEGATIVE
NRBC BLD AUTO-RTO: 0 /100 WBC (ref 0–0.2)
PH UR STRIP.AUTO: 6 [PH] (ref 5–8)
PLATELET # BLD AUTO: 180 10*3/MM3 (ref 140–450)
PMV BLD AUTO: 11.3 FL (ref 6–12)
POTASSIUM SERPL-SCNC: 4.2 MMOL/L (ref 3.5–5.2)
PROT SERPL-MCNC: 6.5 G/DL (ref 6–8.5)
PROT UR QL STRIP: NEGATIVE
RBC # BLD AUTO: 5.17 10*6/MM3 (ref 3.77–5.28)
SODIUM SERPL-SCNC: 141 MMOL/L (ref 136–145)
SP GR UR STRIP: 1.02 (ref 1–1.03)
TRIGL SERPL-MCNC: 101 MG/DL (ref 0–150)
TSH SERPL DL<=0.05 MIU/L-ACNC: 1.85 UIU/ML (ref 0.27–4.2)
UROBILINOGEN UR QL STRIP: ABNORMAL
VIT B12 BLD-MCNC: 674 PG/ML (ref 211–946)
VLDLC SERPL-MCNC: 18 MG/DL (ref 5–40)
WBC # BLD AUTO: 6.33 10*3/MM3 (ref 3.4–10.8)

## 2021-06-07 PROCEDURE — 80053 COMPREHEN METABOLIC PANEL: CPT | Performed by: FAMILY MEDICINE

## 2021-06-07 PROCEDURE — 81003 URINALYSIS AUTO W/O SCOPE: CPT | Performed by: FAMILY MEDICINE

## 2021-06-07 PROCEDURE — 86803 HEPATITIS C AB TEST: CPT | Performed by: FAMILY MEDICINE

## 2021-06-07 PROCEDURE — 36415 COLL VENOUS BLD VENIPUNCTURE: CPT | Performed by: FAMILY MEDICINE

## 2021-06-07 PROCEDURE — 80061 LIPID PANEL: CPT | Performed by: FAMILY MEDICINE

## 2021-06-07 PROCEDURE — 83036 HEMOGLOBIN GLYCOSYLATED A1C: CPT | Performed by: FAMILY MEDICINE

## 2021-06-07 PROCEDURE — 85025 COMPLETE CBC W/AUTO DIFF WBC: CPT | Performed by: FAMILY MEDICINE

## 2021-06-07 PROCEDURE — 84443 ASSAY THYROID STIM HORMONE: CPT | Performed by: FAMILY MEDICINE

## 2021-06-07 PROCEDURE — 82607 VITAMIN B-12: CPT | Performed by: FAMILY MEDICINE

## 2021-06-07 RX ORDER — MELOXICAM 15 MG/1
15 TABLET ORAL DAILY
Qty: 30 TABLET | Refills: 2 | Status: SHIPPED | OUTPATIENT
Start: 2021-06-07 | End: 2021-08-30 | Stop reason: SDUPTHER

## 2021-06-07 RX ORDER — ATORVASTATIN CALCIUM 10 MG/1
10 TABLET, FILM COATED ORAL DAILY
Qty: 90 TABLET | Refills: 3 | Status: SHIPPED | OUTPATIENT
Start: 2021-06-07 | End: 2022-06-07 | Stop reason: SDUPTHER

## 2021-06-11 DIAGNOSIS — T78.40XA ALLERGIC REACTION, INITIAL ENCOUNTER: Primary | ICD-10-CM

## 2021-06-11 RX ORDER — EPINEPHRINE 0.3 MG/.3ML
0.3 INJECTION SUBCUTANEOUS ONCE AS NEEDED
Qty: 2 EACH | Refills: 5 | Status: SHIPPED | OUTPATIENT
Start: 2021-06-11 | End: 2023-01-23 | Stop reason: SDUPTHER

## 2021-06-15 ENCOUNTER — TELEPHONE (OUTPATIENT)
Dept: FAMILY MEDICINE CLINIC | Facility: CLINIC | Age: 56
End: 2021-06-15

## 2021-06-15 DIAGNOSIS — K21.9 GASTROESOPHAGEAL REFLUX DISEASE WITHOUT ESOPHAGITIS: Primary | ICD-10-CM

## 2021-06-15 RX ORDER — OMEPRAZOLE 20 MG/1
20 CAPSULE, DELAYED RELEASE ORAL DAILY
Qty: 90 CAPSULE | Refills: 3 | Status: SHIPPED | OUTPATIENT
Start: 2021-06-15 | End: 2023-01-23 | Stop reason: SDUPTHER

## 2021-06-15 NOTE — TELEPHONE ENCOUNTER
Pharmacy Name: [facility] [address] [place] [address] - [address] [place] [address] - [phone] [facility] [phone] [facility] (Pharmacy)    Pharmacy representative name:NATALY     Pharmacy representative phone number: [phone]    What medication are you calling in regards to: omeprazole OTC (PriLOSEC OTC) 20 MG EC tablet    What question does the pharmacy have: CAN IT BE CHANGED TO CAPSULES TABLETS ARE NOT COVERED UNDER PATIENT INSURANCE     Who is the provider that prescribed the medication:

## 2021-08-30 DIAGNOSIS — I10 ESSENTIAL (PRIMARY) HYPERTENSION: ICD-10-CM

## 2021-08-30 RX ORDER — MELOXICAM 15 MG/1
15 TABLET ORAL DAILY
Qty: 30 TABLET | Refills: 5 | Status: SHIPPED | OUTPATIENT
Start: 2021-08-30 | End: 2022-03-05 | Stop reason: SDUPTHER

## 2021-10-04 ENCOUNTER — IMMUNIZATION (OUTPATIENT)
Dept: VACCINE CLINIC | Facility: HOSPITAL | Age: 56
End: 2021-10-04

## 2021-10-04 PROCEDURE — 91300 HC SARSCOV02 VAC 30MCG/0.3ML IM: CPT | Performed by: INTERNAL MEDICINE

## 2021-10-04 PROCEDURE — 0004A ADM SARSCOV2 30MCG/0.3ML BOOSTER: CPT | Performed by: INTERNAL MEDICINE

## 2021-10-04 PROCEDURE — 0003A: CPT | Performed by: INTERNAL MEDICINE

## 2021-10-05 ENCOUNTER — APPOINTMENT (OUTPATIENT)
Dept: VACCINE CLINIC | Facility: HOSPITAL | Age: 56
End: 2021-10-05

## 2021-11-10 ENCOUNTER — TRANSCRIBE ORDERS (OUTPATIENT)
Dept: ADMINISTRATIVE | Facility: HOSPITAL | Age: 56
End: 2021-11-10

## 2021-11-10 DIAGNOSIS — Z12.31 ENCOUNTER FOR SCREENING MAMMOGRAM FOR MALIGNANT NEOPLASM OF BREAST: Primary | ICD-10-CM

## 2021-11-17 ENCOUNTER — HOSPITAL ENCOUNTER (OUTPATIENT)
Dept: MAMMOGRAPHY | Facility: HOSPITAL | Age: 56
Discharge: HOME | End: 2021-11-17
Admitting: FAMILY MEDICINE

## 2021-11-17 DIAGNOSIS — Z12.31 ENCOUNTER FOR SCREENING MAMMOGRAM FOR MALIGNANT NEOPLASM OF BREAST: ICD-10-CM

## 2021-11-17 PROCEDURE — 77067 SCR MAMMO BI INCL CAD: CPT

## 2021-11-17 PROCEDURE — 77063 BREAST TOMOSYNTHESIS BI: CPT

## 2021-11-20 ENCOUNTER — APPOINTMENT (OUTPATIENT)
Dept: MAMMOGRAPHY | Facility: HOSPITAL | Age: 56
End: 2021-11-20

## 2022-03-05 DIAGNOSIS — I10 ESSENTIAL (PRIMARY) HYPERTENSION: ICD-10-CM

## 2022-03-07 RX ORDER — BLOOD SUGAR DIAGNOSTIC
STRIP MISCELLANEOUS
Qty: 200 EACH | Refills: 5 | Status: SHIPPED | OUTPATIENT
Start: 2022-03-07

## 2022-03-07 RX ORDER — MELOXICAM 15 MG/1
15 TABLET ORAL DAILY
Qty: 30 TABLET | Refills: 2 | Status: SHIPPED | OUTPATIENT
Start: 2022-03-07 | End: 2022-06-07 | Stop reason: SDUPTHER

## 2022-06-07 DIAGNOSIS — E78.2 MIXED HYPERLIPIDEMIA: ICD-10-CM

## 2022-06-07 DIAGNOSIS — I10 ESSENTIAL (PRIMARY) HYPERTENSION: ICD-10-CM

## 2022-06-07 DIAGNOSIS — E11.9 TYPE 2 DIABETES MELLITUS WITHOUT COMPLICATION, WITHOUT LONG-TERM CURRENT USE OF INSULIN: ICD-10-CM

## 2022-06-08 RX ORDER — ATORVASTATIN CALCIUM 10 MG/1
10 TABLET, FILM COATED ORAL DAILY
Qty: 30 TABLET | Refills: 0 | Status: SHIPPED | OUTPATIENT
Start: 2022-06-08 | End: 2022-07-12 | Stop reason: SDUPTHER

## 2022-06-08 RX ORDER — GLIPIZIDE 5 MG/1
5 TABLET, FILM COATED, EXTENDED RELEASE ORAL DAILY PRN
Qty: 30 TABLET | Refills: 0 | Status: SHIPPED | OUTPATIENT
Start: 2022-06-08 | End: 2023-01-23 | Stop reason: SDUPTHER

## 2022-06-08 RX ORDER — METFORMIN HYDROCHLORIDE 500 MG/1
500 TABLET, EXTENDED RELEASE ORAL DAILY
Qty: 30 TABLET | Refills: 0 | Status: SHIPPED | OUTPATIENT
Start: 2022-06-08 | End: 2022-06-29 | Stop reason: SDUPTHER

## 2022-06-08 RX ORDER — MELOXICAM 15 MG/1
15 TABLET ORAL DAILY
Qty: 30 TABLET | Refills: 0 | Status: SHIPPED | OUTPATIENT
Start: 2022-06-08 | End: 2022-06-29 | Stop reason: SDUPTHER

## 2022-06-08 RX ORDER — QUINAPRIL 10 MG/1
10 TABLET ORAL DAILY
Qty: 30 TABLET | Refills: 0 | Status: SHIPPED | OUTPATIENT
Start: 2022-06-08 | End: 2022-06-29 | Stop reason: SDUPTHER

## 2022-06-08 RX ORDER — HYDROCHLOROTHIAZIDE 25 MG/1
25 TABLET ORAL DAILY
Qty: 30 TABLET | Refills: 0 | Status: SHIPPED | OUTPATIENT
Start: 2022-06-08 | End: 2022-06-29 | Stop reason: SDUPTHER

## 2022-06-29 ENCOUNTER — OFFICE VISIT (OUTPATIENT)
Dept: FAMILY MEDICINE CLINIC | Facility: CLINIC | Age: 57
End: 2022-06-29

## 2022-06-29 VITALS
TEMPERATURE: 98.4 F | HEART RATE: 83 BPM | WEIGHT: 293 LBS | DIASTOLIC BLOOD PRESSURE: 90 MMHG | SYSTOLIC BLOOD PRESSURE: 160 MMHG | OXYGEN SATURATION: 97 % | HEIGHT: 71 IN | RESPIRATION RATE: 18 BRPM | BODY MASS INDEX: 41.02 KG/M2

## 2022-06-29 DIAGNOSIS — E11.9 TYPE 2 DIABETES MELLITUS WITHOUT COMPLICATION, WITHOUT LONG-TERM CURRENT USE OF INSULIN: Primary | ICD-10-CM

## 2022-06-29 DIAGNOSIS — E78.2 MIXED HYPERLIPIDEMIA: ICD-10-CM

## 2022-06-29 DIAGNOSIS — K21.9 GASTROESOPHAGEAL REFLUX DISEASE WITHOUT ESOPHAGITIS: ICD-10-CM

## 2022-06-29 DIAGNOSIS — E66.01 MORBID OBESITY: ICD-10-CM

## 2022-06-29 DIAGNOSIS — M25.561 ARTHRALGIA OF RIGHT KNEE: ICD-10-CM

## 2022-06-29 DIAGNOSIS — I10 ESSENTIAL (PRIMARY) HYPERTENSION: ICD-10-CM

## 2022-06-29 LAB
EXPIRATION DATE: ABNORMAL
GLUCOSE BLDC GLUCOMTR-MCNC: 93 MG/DL (ref 70–130)
HBA1C MFR BLD: 6.9 %
Lab: ABNORMAL

## 2022-06-29 PROCEDURE — 82962 GLUCOSE BLOOD TEST: CPT | Performed by: FAMILY MEDICINE

## 2022-06-29 PROCEDURE — 83036 HEMOGLOBIN GLYCOSYLATED A1C: CPT | Performed by: FAMILY MEDICINE

## 2022-06-29 PROCEDURE — 99214 OFFICE O/P EST MOD 30 MIN: CPT | Performed by: FAMILY MEDICINE

## 2022-06-29 RX ORDER — METFORMIN HYDROCHLORIDE 500 MG/1
500 TABLET, EXTENDED RELEASE ORAL DAILY
Qty: 90 TABLET | Refills: 2 | Status: SHIPPED | OUTPATIENT
Start: 2022-06-29 | End: 2023-01-23 | Stop reason: SDUPTHER

## 2022-06-29 RX ORDER — QUINAPRIL 20 MG/1
20 TABLET ORAL DAILY
Qty: 90 TABLET | Refills: 2 | Status: SHIPPED | OUTPATIENT
Start: 2022-06-29 | End: 2023-01-23 | Stop reason: SDUPTHER

## 2022-06-29 RX ORDER — MELOXICAM 15 MG/1
15 TABLET ORAL DAILY
Qty: 90 TABLET | Refills: 1 | Status: SHIPPED | OUTPATIENT
Start: 2022-06-29 | End: 2023-01-23 | Stop reason: SDUPTHER

## 2022-06-29 RX ORDER — HYDROCHLOROTHIAZIDE 25 MG/1
25 TABLET ORAL DAILY
Qty: 90 TABLET | Refills: 1 | Status: SHIPPED | OUTPATIENT
Start: 2022-06-29 | End: 2023-01-23 | Stop reason: SDUPTHER

## 2022-07-12 ENCOUNTER — PATIENT MESSAGE (OUTPATIENT)
Dept: FAMILY MEDICINE CLINIC | Facility: CLINIC | Age: 57
End: 2022-07-12

## 2022-07-12 ENCOUNTER — LAB (OUTPATIENT)
Dept: LAB | Facility: HOSPITAL | Age: 57
End: 2022-07-12

## 2022-07-12 DIAGNOSIS — E78.2 MIXED HYPERLIPIDEMIA: ICD-10-CM

## 2022-07-12 LAB
ALBUMIN SERPL-MCNC: 4.3 G/DL (ref 3.5–5.2)
ALBUMIN/GLOB SERPL: 2 G/DL
ALP SERPL-CCNC: 46 U/L (ref 39–117)
ALT SERPL W P-5'-P-CCNC: 41 U/L (ref 1–33)
ANION GAP SERPL CALCULATED.3IONS-SCNC: 9 MMOL/L (ref 5–15)
AST SERPL-CCNC: 30 U/L (ref 1–32)
BASOPHILS # BLD AUTO: 0.03 10*3/MM3 (ref 0–0.2)
BASOPHILS NFR BLD AUTO: 0.5 % (ref 0–1.5)
BILIRUB SERPL-MCNC: 1.8 MG/DL (ref 0–1.2)
BUN SERPL-MCNC: 20 MG/DL (ref 6–20)
BUN/CREAT SERPL: 25 (ref 7–25)
CALCIUM SPEC-SCNC: 10.1 MG/DL (ref 8.6–10.5)
CHLORIDE SERPL-SCNC: 106 MMOL/L (ref 98–107)
CHOLEST SERPL-MCNC: 170 MG/DL (ref 0–200)
CHROMATIN AB SERPL-ACNC: <10 IU/ML (ref 0–14)
CO2 SERPL-SCNC: 28 MMOL/L (ref 22–29)
CREAT SERPL-MCNC: 0.8 MG/DL (ref 0.57–1)
DEPRECATED RDW RBC AUTO: 41.2 FL (ref 37–54)
EGFRCR SERPLBLD CKD-EPI 2021: 85.5 ML/MIN/1.73
EOSINOPHIL # BLD AUTO: 0.11 10*3/MM3 (ref 0–0.4)
EOSINOPHIL NFR BLD AUTO: 1.7 % (ref 0.3–6.2)
ERYTHROCYTE [DISTWIDTH] IN BLOOD BY AUTOMATED COUNT: 12.7 % (ref 12.3–15.4)
ERYTHROCYTE [SEDIMENTATION RATE] IN BLOOD: 1 MM/HR (ref 0–30)
GLOBULIN UR ELPH-MCNC: 2.1 GM/DL
GLUCOSE SERPL-MCNC: 165 MG/DL (ref 65–99)
HCT VFR BLD AUTO: 44 % (ref 34–46.6)
HDLC SERPL QL: 2.83
HDLC SERPL-MCNC: 60 MG/DL (ref 40–60)
HGB BLD-MCNC: 14.5 G/DL (ref 12–15.9)
IMM GRANULOCYTES # BLD AUTO: 0.01 10*3/MM3 (ref 0–0.05)
IMM GRANULOCYTES NFR BLD AUTO: 0.2 % (ref 0–0.5)
LDLC SERPL CALC-MCNC: 93 MG/DL (ref 0–100)
LYMPHOCYTES # BLD AUTO: 3.25 10*3/MM3 (ref 0.7–3.1)
LYMPHOCYTES NFR BLD AUTO: 51.3 % (ref 19.6–45.3)
MCH RBC QN AUTO: 29.5 PG (ref 26.6–33)
MCHC RBC AUTO-ENTMCNC: 33 G/DL (ref 31.5–35.7)
MCV RBC AUTO: 89.4 FL (ref 79–97)
MONOCYTES # BLD AUTO: 0.61 10*3/MM3 (ref 0.1–0.9)
MONOCYTES NFR BLD AUTO: 9.6 % (ref 5–12)
NEUTROPHILS NFR BLD AUTO: 2.33 10*3/MM3 (ref 1.7–7)
NEUTROPHILS NFR BLD AUTO: 36.7 % (ref 42.7–76)
NRBC BLD AUTO-RTO: 0 /100 WBC (ref 0–0.2)
PLATELET # BLD AUTO: 179 10*3/MM3 (ref 140–450)
PMV BLD AUTO: 11.3 FL (ref 6–12)
POTASSIUM SERPL-SCNC: 4.7 MMOL/L (ref 3.5–5.2)
PROT SERPL-MCNC: 6.4 G/DL (ref 6–8.5)
RBC # BLD AUTO: 4.92 10*6/MM3 (ref 3.77–5.28)
SODIUM SERPL-SCNC: 143 MMOL/L (ref 136–145)
TRIGL SERPL-MCNC: 93 MG/DL (ref 0–150)
TSH SERPL DL<=0.05 MIU/L-ACNC: 1.74 UIU/ML (ref 0.27–4.2)
VLDLC SERPL-MCNC: 17 MG/DL (ref 5–40)
WBC NRBC COR # BLD: 6.34 10*3/MM3 (ref 3.4–10.8)

## 2022-07-12 PROCEDURE — 86431 RHEUMATOID FACTOR QUANT: CPT | Performed by: FAMILY MEDICINE

## 2022-07-12 PROCEDURE — 85652 RBC SED RATE AUTOMATED: CPT | Performed by: FAMILY MEDICINE

## 2022-07-12 PROCEDURE — 86038 ANTINUCLEAR ANTIBODIES: CPT | Performed by: FAMILY MEDICINE

## 2022-07-12 PROCEDURE — 80050 GENERAL HEALTH PANEL: CPT | Performed by: FAMILY MEDICINE

## 2022-07-12 PROCEDURE — 80061 LIPID PANEL: CPT | Performed by: FAMILY MEDICINE

## 2022-07-12 PROCEDURE — 36415 COLL VENOUS BLD VENIPUNCTURE: CPT | Performed by: FAMILY MEDICINE

## 2022-07-12 RX ORDER — ATORVASTATIN CALCIUM 10 MG/1
10 TABLET, FILM COATED ORAL DAILY
Qty: 30 TABLET | Refills: 0 | Status: CANCELLED | OUTPATIENT
Start: 2022-07-12

## 2022-07-12 RX ORDER — ATORVASTATIN CALCIUM 10 MG/1
10 TABLET, FILM COATED ORAL DAILY
Qty: 90 TABLET | Refills: 2 | Status: SHIPPED | OUTPATIENT
Start: 2022-07-12 | End: 2022-07-13 | Stop reason: SDUPTHER

## 2022-07-12 NOTE — TELEPHONE ENCOUNTER
From: Sandra Lopez  To: Jessica Jensen MD  Sent: 7/12/2022 1:45 PM EDT  Subject: Question regarding LIPID PANEL W/ CHOL/HDL RATIO    Will need refill on lipitor

## 2022-07-13 LAB — ANA SER QL: NEGATIVE

## 2022-07-13 RX ORDER — ATORVASTATIN CALCIUM 10 MG/1
10 TABLET, FILM COATED ORAL DAILY
Qty: 90 TABLET | Refills: 2 | Status: SHIPPED | OUTPATIENT
Start: 2022-07-13 | End: 2023-01-23 | Stop reason: SDUPTHER

## 2022-07-17 PROBLEM — M25.561 ARTHRALGIA OF RIGHT KNEE: Status: ACTIVE | Noted: 2022-07-17

## 2022-07-17 PROBLEM — E11.9 TYPE 2 DIABETES MELLITUS WITHOUT COMPLICATION, WITHOUT LONG-TERM CURRENT USE OF INSULIN (HCC): Status: ACTIVE | Noted: 2022-07-17

## 2022-07-17 PROBLEM — R73.9 HYPERGLYCEMIA: Status: RESOLVED | Noted: 2020-11-21 | Resolved: 2022-07-17

## 2022-07-17 PROBLEM — K21.9 GASTROESOPHAGEAL REFLUX DISEASE WITHOUT ESOPHAGITIS: Status: ACTIVE | Noted: 2022-07-17

## 2022-07-17 PROBLEM — K76.89 LIVER CYST: Status: ACTIVE | Noted: 2022-07-17

## 2022-07-18 NOTE — ASSESSMENT & PLAN NOTE
Hypertension is uncontrolled.  Medication changes per orders.  Increase quinapril to 20 mg daily.  Blood pressure will be reassessed at the next regular appointment.

## 2022-07-18 NOTE — ASSESSMENT & PLAN NOTE
Diabetes is stable/controlled.  A1c 6.9..   Continue current treatment regimen.  Diabetes will be reassessed in 6 months.  Consider GLP1 agonist to aid in weight loss.

## 2022-08-02 ENCOUNTER — IMMUNIZATION (OUTPATIENT)
Dept: VACCINE CLINIC | Facility: HOSPITAL | Age: 57
End: 2022-08-02

## 2022-08-02 DIAGNOSIS — Z23 NEED FOR VACCINATION: Primary | ICD-10-CM

## 2022-08-02 PROCEDURE — 91305 HC SARSCOV2 VAC 30 MCG TRS-SUCR PFIZER: CPT | Performed by: INTERNAL MEDICINE

## 2022-08-02 PROCEDURE — 0052A HC ADM SARSCV2 30MCG TRS-SUCR 2ND DOSE: CPT | Performed by: INTERNAL MEDICINE

## 2022-09-20 ENCOUNTER — TRANSCRIBE ORDERS (OUTPATIENT)
Dept: ADMINISTRATIVE | Facility: HOSPITAL | Age: 57
End: 2022-09-20

## 2022-09-20 DIAGNOSIS — Z12.31 VISIT FOR SCREENING MAMMOGRAM: Primary | ICD-10-CM

## 2022-10-03 ENCOUNTER — IMMUNIZATION (OUTPATIENT)
Dept: VACCINE CLINIC | Facility: HOSPITAL | Age: 57
End: 2022-10-03

## 2022-10-03 DIAGNOSIS — Z23 NEED FOR VACCINATION: Primary | ICD-10-CM

## 2022-10-03 PROCEDURE — 91312 HC SARSCOV2 VAC 30MCG/0.3ML IM BIVALENT BOOSTER 12 YRS AND OLDER: CPT | Performed by: INTERNAL MEDICINE

## 2022-10-03 PROCEDURE — 0124A: CPT | Performed by: INTERNAL MEDICINE

## 2022-10-06 DIAGNOSIS — E11.9 TYPE 2 DIABETES MELLITUS WITHOUT COMPLICATION, WITHOUT LONG-TERM CURRENT USE OF INSULIN: ICD-10-CM

## 2022-10-06 RX ORDER — GLIPIZIDE 5 MG/1
5 TABLET, FILM COATED, EXTENDED RELEASE ORAL DAILY PRN
Qty: 30 TABLET | Refills: 0 | OUTPATIENT
Start: 2022-10-06

## 2022-10-08 DIAGNOSIS — E11.9 TYPE 2 DIABETES MELLITUS WITHOUT COMPLICATION, WITHOUT LONG-TERM CURRENT USE OF INSULIN: ICD-10-CM

## 2022-10-08 RX ORDER — GLIPIZIDE 5 MG/1
5 TABLET, FILM COATED, EXTENDED RELEASE ORAL DAILY PRN
Qty: 30 TABLET | Refills: 0 | Status: CANCELLED | OUTPATIENT
Start: 2022-10-08

## 2022-10-09 DIAGNOSIS — E11.9 TYPE 2 DIABETES MELLITUS WITHOUT COMPLICATION, WITHOUT LONG-TERM CURRENT USE OF INSULIN: ICD-10-CM

## 2022-10-10 RX ORDER — GLIPIZIDE 5 MG/1
5 TABLET, FILM COATED, EXTENDED RELEASE ORAL DAILY PRN
Qty: 30 TABLET | Refills: 0 | OUTPATIENT
Start: 2022-10-10

## 2022-11-25 ENCOUNTER — APPOINTMENT (OUTPATIENT)
Dept: MAMMOGRAPHY | Facility: HOSPITAL | Age: 57
End: 2022-11-25

## 2022-12-07 ENCOUNTER — HOSPITAL ENCOUNTER (OUTPATIENT)
Dept: MAMMOGRAPHY | Facility: HOSPITAL | Age: 57
Discharge: HOME | End: 2022-12-07
Admitting: FAMILY MEDICINE

## 2022-12-07 DIAGNOSIS — Z12.31 VISIT FOR SCREENING MAMMOGRAM: ICD-10-CM

## 2022-12-07 PROCEDURE — 77067 SCR MAMMO BI INCL CAD: CPT

## 2022-12-07 PROCEDURE — 77063 BREAST TOMOSYNTHESIS BI: CPT

## 2023-01-07 DIAGNOSIS — E11.9 TYPE 2 DIABETES MELLITUS WITHOUT COMPLICATION, WITHOUT LONG-TERM CURRENT USE OF INSULIN: ICD-10-CM

## 2023-01-07 DIAGNOSIS — I10 ESSENTIAL (PRIMARY) HYPERTENSION: ICD-10-CM

## 2023-01-07 DIAGNOSIS — M25.561 ARTHRALGIA OF RIGHT KNEE: ICD-10-CM

## 2023-01-09 RX ORDER — HYDROCHLOROTHIAZIDE 25 MG/1
25 TABLET ORAL DAILY
Qty: 90 TABLET | Refills: 1 | OUTPATIENT
Start: 2023-01-09

## 2023-01-09 RX ORDER — GLIPIZIDE 5 MG/1
5 TABLET, FILM COATED, EXTENDED RELEASE ORAL DAILY PRN
Qty: 30 TABLET | Refills: 0 | OUTPATIENT
Start: 2023-01-09

## 2023-01-09 RX ORDER — MELOXICAM 15 MG/1
15 TABLET ORAL DAILY
Qty: 90 TABLET | Refills: 1 | OUTPATIENT
Start: 2023-01-09

## 2023-01-18 ENCOUNTER — OFFICE VISIT (OUTPATIENT)
Dept: FAMILY MEDICINE CLINIC | Facility: CLINIC | Age: 58
End: 2023-01-18
Payer: COMMERCIAL

## 2023-01-18 VITALS
TEMPERATURE: 98.3 F | OXYGEN SATURATION: 98 % | RESPIRATION RATE: 16 BRPM | HEIGHT: 71 IN | WEIGHT: 293 LBS | BODY MASS INDEX: 41.02 KG/M2 | DIASTOLIC BLOOD PRESSURE: 88 MMHG | SYSTOLIC BLOOD PRESSURE: 150 MMHG | HEART RATE: 115 BPM

## 2023-01-18 DIAGNOSIS — E11.9 TYPE 2 DIABETES MELLITUS WITHOUT COMPLICATION, WITHOUT LONG-TERM CURRENT USE OF INSULIN: ICD-10-CM

## 2023-01-18 DIAGNOSIS — E66.01 CLASS 3 SEVERE OBESITY DUE TO EXCESS CALORIES WITH SERIOUS COMORBIDITY AND BODY MASS INDEX (BMI) OF 40.0 TO 44.9 IN ADULT: ICD-10-CM

## 2023-01-18 DIAGNOSIS — M25.561 ARTHRALGIA OF RIGHT KNEE: ICD-10-CM

## 2023-01-18 DIAGNOSIS — K21.9 GASTROESOPHAGEAL REFLUX DISEASE WITHOUT ESOPHAGITIS: ICD-10-CM

## 2023-01-18 DIAGNOSIS — I10 ESSENTIAL (PRIMARY) HYPERTENSION: ICD-10-CM

## 2023-01-18 DIAGNOSIS — E78.00 PURE HYPERCHOLESTEROLEMIA: ICD-10-CM

## 2023-01-18 DIAGNOSIS — E78.2 MIXED HYPERLIPIDEMIA: ICD-10-CM

## 2023-01-18 DIAGNOSIS — E11.9 TYPE 2 DIABETES MELLITUS WITHOUT COMPLICATION, WITHOUT LONG-TERM CURRENT USE OF INSULIN: Primary | ICD-10-CM

## 2023-01-18 PROBLEM — E66.813 CLASS 3 SEVERE OBESITY DUE TO EXCESS CALORIES WITH SERIOUS COMORBIDITY AND BODY MASS INDEX (BMI) OF 40.0 TO 44.9 IN ADULT: Status: ACTIVE | Noted: 2021-06-03

## 2023-01-18 LAB
EXPIRATION DATE: NORMAL
HBA1C MFR BLD: 8.4 %
Lab: NORMAL

## 2023-01-18 PROCEDURE — 99213 OFFICE O/P EST LOW 20 MIN: CPT | Performed by: STUDENT IN AN ORGANIZED HEALTH CARE EDUCATION/TRAINING PROGRAM

## 2023-01-18 PROCEDURE — 83036 HEMOGLOBIN GLYCOSYLATED A1C: CPT | Performed by: STUDENT IN AN ORGANIZED HEALTH CARE EDUCATION/TRAINING PROGRAM

## 2023-01-18 RX ORDER — ATORVASTATIN CALCIUM 10 MG/1
10 TABLET, FILM COATED ORAL DAILY
Qty: 90 TABLET | Refills: 2 | Status: CANCELLED | OUTPATIENT
Start: 2023-01-18

## 2023-01-18 RX ORDER — OMEPRAZOLE 20 MG/1
20 CAPSULE, DELAYED RELEASE ORAL DAILY
Qty: 90 CAPSULE | Refills: 3 | Status: CANCELLED | OUTPATIENT
Start: 2023-01-18

## 2023-01-18 RX ORDER — QUINAPRIL 20 MG/1
20 TABLET ORAL DAILY
Qty: 90 TABLET | Refills: 2 | Status: CANCELLED | OUTPATIENT
Start: 2023-01-18 | End: 2023-04-18

## 2023-01-18 RX ORDER — MELOXICAM 15 MG/1
15 TABLET ORAL DAILY
Qty: 90 TABLET | Refills: 1 | Status: CANCELLED | OUTPATIENT
Start: 2023-01-18

## 2023-01-18 RX ORDER — GLIPIZIDE 5 MG/1
5 TABLET, FILM COATED, EXTENDED RELEASE ORAL DAILY PRN
Qty: 90 TABLET | Refills: 1 | Status: CANCELLED | OUTPATIENT
Start: 2023-01-18

## 2023-01-18 RX ORDER — METFORMIN HYDROCHLORIDE 500 MG/1
500 TABLET, EXTENDED RELEASE ORAL DAILY
Qty: 90 TABLET | Refills: 2 | Status: CANCELLED | OUTPATIENT
Start: 2023-01-18

## 2023-01-18 RX ORDER — HYDROCHLOROTHIAZIDE 25 MG/1
25 TABLET ORAL DAILY
Qty: 90 TABLET | Refills: 2 | Status: CANCELLED | OUTPATIENT
Start: 2023-01-18

## 2023-01-19 ENCOUNTER — PATIENT ROUNDING (BHMG ONLY) (OUTPATIENT)
Dept: FAMILY MEDICINE CLINIC | Facility: CLINIC | Age: 58
End: 2023-01-19
Payer: COMMERCIAL

## 2023-01-19 NOTE — PROGRESS NOTES
January 19, 2023    Hello, may I speak with Sandra Lopez?    My name is Soraya      I am  with [facility]  [address] [place] [address] [place] [address] [place] [address].    Before we get started may I verify your date of birth? 1965    I am calling to officially welcome you to our practice and ask about your recent visit. Is this a good time to talk? yes    Tell me about your visit with us. What things went well?  really enjoyed visit       We're always looking for ways to make our patients' experiences even better. Do you have recommendations on ways we may improve?  no    Overall were you satisfied with your first visit to our practice? yes       I appreciate you taking the time to speak with me today. Is there anything else I can do for you? no      Thank you, and have a great day.

## 2023-01-23 ENCOUNTER — TELEPHONE (OUTPATIENT)
Dept: FAMILY MEDICINE CLINIC | Facility: CLINIC | Age: 58
End: 2023-01-23
Payer: COMMERCIAL

## 2023-01-23 DIAGNOSIS — I10 ESSENTIAL (PRIMARY) HYPERTENSION: ICD-10-CM

## 2023-01-23 DIAGNOSIS — E11.9 TYPE 2 DIABETES MELLITUS WITHOUT COMPLICATION, WITHOUT LONG-TERM CURRENT USE OF INSULIN: ICD-10-CM

## 2023-01-23 DIAGNOSIS — K21.9 GASTROESOPHAGEAL REFLUX DISEASE WITHOUT ESOPHAGITIS: ICD-10-CM

## 2023-01-23 DIAGNOSIS — M25.561 ARTHRALGIA OF RIGHT KNEE: ICD-10-CM

## 2023-01-23 DIAGNOSIS — E78.2 MIXED HYPERLIPIDEMIA: ICD-10-CM

## 2023-01-23 DIAGNOSIS — T78.40XA ALLERGIC REACTION, INITIAL ENCOUNTER: ICD-10-CM

## 2023-01-23 RX ORDER — GLIPIZIDE 5 MG/1
5 TABLET, FILM COATED, EXTENDED RELEASE ORAL DAILY PRN
Qty: 30 TABLET | Refills: 0 | Status: SHIPPED | OUTPATIENT
Start: 2023-01-23 | End: 2023-01-23 | Stop reason: SDUPTHER

## 2023-01-23 RX ORDER — MELOXICAM 15 MG/1
15 TABLET ORAL DAILY
Qty: 90 TABLET | Refills: 1 | Status: SHIPPED | OUTPATIENT
Start: 2023-01-23

## 2023-01-23 RX ORDER — EPINEPHRINE 0.3 MG/.3ML
0.3 INJECTION SUBCUTANEOUS ONCE AS NEEDED
Qty: 2 EACH | Refills: 5 | Status: SHIPPED | OUTPATIENT
Start: 2023-01-23

## 2023-01-23 RX ORDER — QUINAPRIL 20 MG/1
20 TABLET ORAL DAILY
Qty: 90 TABLET | Refills: 2 | Status: SHIPPED | OUTPATIENT
Start: 2023-01-23

## 2023-01-23 RX ORDER — MELOXICAM 15 MG/1
15 TABLET ORAL DAILY
Qty: 90 TABLET | Refills: 1 | Status: SHIPPED | OUTPATIENT
Start: 2023-01-23 | End: 2023-01-23 | Stop reason: SDUPTHER

## 2023-01-23 RX ORDER — ATORVASTATIN CALCIUM 10 MG/1
10 TABLET, FILM COATED ORAL DAILY
Qty: 90 TABLET | Refills: 2 | Status: SHIPPED | OUTPATIENT
Start: 2023-01-23

## 2023-01-23 RX ORDER — HYDROCHLOROTHIAZIDE 25 MG/1
25 TABLET ORAL DAILY
Qty: 90 TABLET | Refills: 1 | Status: SHIPPED | OUTPATIENT
Start: 2023-01-23 | End: 2023-01-23 | Stop reason: SDUPTHER

## 2023-01-23 RX ORDER — ATORVASTATIN CALCIUM 10 MG/1
10 TABLET, FILM COATED ORAL DAILY
Qty: 90 TABLET | Refills: 2 | Status: SHIPPED | OUTPATIENT
Start: 2023-01-23 | End: 2023-01-23 | Stop reason: SDUPTHER

## 2023-01-23 RX ORDER — GLIPIZIDE 5 MG/1
5 TABLET, FILM COATED, EXTENDED RELEASE ORAL DAILY PRN
Qty: 30 TABLET | Refills: 0 | Status: SHIPPED | OUTPATIENT
Start: 2023-01-23

## 2023-01-23 RX ORDER — METFORMIN HYDROCHLORIDE 500 MG/1
500 TABLET, EXTENDED RELEASE ORAL DAILY
Qty: 90 TABLET | Refills: 2 | Status: SHIPPED | OUTPATIENT
Start: 2023-01-23

## 2023-01-23 RX ORDER — HYDROCHLOROTHIAZIDE 25 MG/1
25 TABLET ORAL DAILY
Qty: 90 TABLET | Refills: 1 | Status: SHIPPED | OUTPATIENT
Start: 2023-01-23

## 2023-01-23 RX ORDER — METFORMIN HYDROCHLORIDE 500 MG/1
500 TABLET, EXTENDED RELEASE ORAL DAILY
Qty: 90 TABLET | Refills: 2 | Status: SHIPPED | OUTPATIENT
Start: 2023-01-23 | End: 2023-01-23 | Stop reason: SDUPTHER

## 2023-01-23 RX ORDER — OMEPRAZOLE 20 MG/1
20 CAPSULE, DELAYED RELEASE ORAL DAILY
Qty: 90 CAPSULE | Refills: 3 | Status: SHIPPED | OUTPATIENT
Start: 2023-01-23

## 2023-01-23 RX ORDER — OMEPRAZOLE 20 MG/1
20 CAPSULE, DELAYED RELEASE ORAL DAILY
Qty: 90 CAPSULE | Refills: 3 | Status: SHIPPED | OUTPATIENT
Start: 2023-01-23 | End: 2023-01-23 | Stop reason: SDUPTHER

## 2023-01-23 RX ORDER — QUINAPRIL 20 MG/1
20 TABLET ORAL DAILY
Qty: 90 TABLET | Refills: 2 | Status: SHIPPED | OUTPATIENT
Start: 2023-01-23 | End: 2023-01-23 | Stop reason: SDUPTHER

## 2023-01-23 NOTE — TELEPHONE ENCOUNTER
Caller: Sandra Lopez    Relationship: Self    Best call back number: [phone]    What was the call regarding: PATIENT STATED THAT SHE WAS IN ON 01/18/23, AND ALL OF HER MEDICATIONS WAS SUPPOSE TO BE SENT TO THE PHARMACY.    SHE STATED THESE HAVE NOT BEEN RECEIVED.    Do you require a callback: YES, PATIENT WOULD LIKE A CALL ONCE THIS HAS BEEN DONE

## 2023-06-30 PROBLEM — Z00.00 WELLNESS EXAMINATION: Status: ACTIVE | Noted: 2023-06-30

## 2023-09-09 ENCOUNTER — E-VISIT (OUTPATIENT)
Dept: FAMILY MEDICINE CLINIC | Facility: TELEHEALTH | Age: 58
End: 2023-09-09
Payer: COMMERCIAL

## 2023-09-09 PROCEDURE — BRIGHTMDVISIT: Performed by: NURSE PRACTITIONER

## 2023-09-09 NOTE — E-VISIT TREATED
Chief Complaint: Bladder infection (UTI)   Patient introduction   Patient is 58-year-old female.   Patient has had dysuria, frequent urination, and urinary urgency for 1 to 3 days.   Urine is yellow with a strong or pungent odor.   General presentation   Patient has not had a fever. No nausea or vomiting.   Mild abdominal or pelvic pain.   No back pain.   No flank pain. Difficulty starting, stopping, or delaying urination.   The following treatments were helpful for current symptoms:    Phenazopyridine   The following treatments were not helpful for current symptoms:    Acetaminophen    Ibuprofen   Previous history of UTI. Current symptoms feel exactly the same as previous UTIs. Received treatment for UTI 0 times in last year.   Previously developed yeast infections as a result of taking antibiotics for past UTIs.   No history of pyelonephritis. History of kidney stones, but not within the last year.   Had sexual intercourse in the past week. Does not use diaphragm. No unprotected sexual intercourse with a new partner in the last 2 weeks.   Patient is being treated for diabetes mellitus. Had an HbA1C test in the last 6 months. Result of last HbA1C test was lower than 7%. Not taking SGLT2 inhibitors as part of diabetes treatment plan.   No history of the following complications from diabetes:    Retinopathy    Neuropathy    Nephropathy    Cardiovascular disease   Review of red flags/alarm symptoms:    No recent hospitalizations or nursing home care (last 3 months)    No history of renal failure    No recent history of urologic instrumentation    No anatomic abnormalities of the urinary tract    No abnormal vaginal discharge    No visible vaginal sores    No pain with sexual intercourse    No abnormal vaginal bleeding or spotting   Pregnancy/menstrual status/breastfeeding:   Patient is postmenopausal.   Current medications   Currently taking CINNAMON PO, Cranberry 500 MG capsule, atorvastatin 10 MG tablet,  meloxicam 15 MG tablet, omeprazole 20 MG capsule, freestyle lancets, Zinc 50 MG tablet, cetirizine 10 MG tablet, fish oil 1000 MG capsule capsule, lisinopril 20 MG tablet, glipizide 5 MG ER tablet, Alcohol Pads 70 % pads, aspirin 81 MG EC tablet, metFORMIN  MG 24 hr tablet, FREESTYLE LITE test strip, and hydroCHLOROthiazide 25 MG tablet.   Medication allergies   No relevant drug allergies.   Medication contraindication review   Patient is currently taking an ACE inhibitor. Therefore, medications containing trimethoprim will not be prescribed.   Patient is being treated for high blood pressure. Therefore, the following medication(s) will not be prescribed:    Ciprofloxacin   No known history of amoxicillin-clavulanate-associated cholestatic jaundice or nitrofurantoin-associated cholestatic jaundice.   Past medical history   Immune conditions: No immunocompromising conditions.   No history of cancer.   Patient did not request an excuse note.   Assessment   Uncomplicated acute UTI.   This is the likely diagnosis based on patient's interview responses, including:    Symptom profile    Previous history of UTI    Current symptoms are exactly the same as previous UTIs    Recent history of delaying urination   No recent history of kidney stones.   Plan   Medications:    nitrofurantoin monohydrate/macrocrystals 100 mg capsule RX 100mg 1 cap PO q12h 5d for infection. This medication is an antibiotic. Take it exactly as directed. You must finish the entire course of medication, even if you feel better after taking the first few doses. Amount is 10 cap.    fluconazole 150 mg tablet RX 150mg 1 tab PO once 1d as a single dose for vaginal yeast infection. Repeat in 72 hours if symptoms persist. Amount is 2 tab.   The patient's prescriptions will be sent to:   [id number] [facility] [place] [facility]   Phone: [phone]     Fax: [phone]   Education:    Condition and causes    Prevention    Treatment  and self-care    When to call provider   Follow-up:   Patient to follow up as needed for progression or lack of improvement in symptoms within 3d.   ----------   Electronically signed by PATRICIA Gan on 2023-09-09 at 09:26AM   ----------   Patient Interview Transcript:   Knowing about your anatomy is important for diagnosing and treating UTIs. The gender we have on file for you is female, but we realize that this might not tell the whole story. Would you like to tell us more about your anatomy?    No   Not selected:    Yes   Which of these symptoms do you have? Select all that apply.    Pain or burning while urinating    Frequent urination    Sudden urge to urinate and it's hard to hold the urine in   How long have you had these symptoms? Select one.    1 to 3 days   Not selected:    Less than 24 hours    4 to 6 days    7 to 10 days    More than 10 days   Since your current symptoms started, has it been difficult to start, stop, or delay urination? Select one.    Yes   Not selected:    No   What color is your urine? Select one.    Yellow   Not selected:    Clear    Cloudy    Pink or red   Does your urine smell strange (like ammonia) or stronger than usual? Select one.    Yes   Not selected:    No   Do you also have any of these symptoms? Select all that apply.    Pain, pressure, or discomfort in the lower abdomen   Not selected:    Fever    Nausea    Vomiting    Back pain    No   How would you describe your lower abdominal pain, pressure, or discomfort? Select one.    Mild; I only notice it when I pay attention to it   Not selected:    Moderate; it's uncomfortable and gets in the way of doing daily tasks    Severe; I can't get comfortable, and it stops me from doing daily tasks   Do you have any flank pain? The flank is the side of the body between the ribs and the hips.    No   Not selected:    Yes, in my left flank    Yes, in my right flank    Yes, in both my left and right flanks   Do you have any of  these vaginal symptoms? Select all that apply.    No   Not selected:    Abnormal vaginal itching    Unscheduled or abnormal vaginal bleeding or spotting    Pain during sex    Visible sores on the vagina    Abnormal vaginal discharge   In the past 2 weeks, have you had a medical device or instrument placed in your urinary tract? Examples include catheters, stents, and nephrostomy tubes. Select one.    No   Not selected:    Yes   Have you recently been hospitalized or been a resident of a nursing home or other long-term care facility? This doesn't include emergency room (ER) visits. Select one.    No   Not selected:    Yes, within the last 2 weeks    Yes, within the last 3 months   Have you ever had severe problems with your kidneys, such as kidney failure? Select one.    No, not that I recall   Not selected:    Yes   Kidney stones    More than a year ago   Not selected:    Within the last year    No   Kidney infection (pyelonephritis)    No   Not selected:    Within the last year    More than a year ago   Have you ever been diagnosed with any of these? Select all that apply.    No   Not selected:    Urinary reflux    Bladder diverticula    Single (or horseshoe) kidney    Duplicated urethra   Have you recently held your urine for a long time after you felt the urge to go? Select one.    Yes   Not selected:    No   Have you recently avoided eating or drinking so you wouldn't have the urge to urinate as often? Select one.    No   Not selected:    Yes   Do you use a diaphragm? Select one.    No   Not selected:    Yes   Have you gone through menopause? Select one.    Yes   Not selected:    No    I'm going through it now   Have you had sexual intercourse in the past week? Recent sexual intercourse is a risk factor for urinary tract infections. Select one.    Yes   Not selected:    No   Have you had unprotected sexual intercourse with a new partner in the last 2 weeks? Select one.    No   Not selected:    Yes   Have you  traveled to any of these countries within the last 3 months? Recent travel to these countries may affect which medication we recommend for your symptoms. Select all that apply.    None of these   Not selected:    [place]    [place]    [place]    [place]   Acetaminophen (Tylenol)    Not helpful   Not selected:    Helpful   Ibuprofen (Advil, Motrin)    Not helpful   Not selected:    Helpful   Phenazopyridine (Azo, Baridium, Pyridium, Uricalm, Uristat)    Helpful   Not selected:    Not helpful   Have you ever had a urinary tract infection (UTI)? A UTI is often called a bladder infection or acute cystitis. Select one.    Yes   Not selected:    No, not that I know of   How much do your current symptoms feel like past UTIs? Select one.    Exactly the same   Not selected:    Mostly the same    Somewhat the same    Totally different   In the past year, how many times have you taken antibiotics for a UTI? Select one.    0   Not selected:    1 to 3    4 or more   Have you ever developed a yeast infection as a result of taking antibiotics? Select one.    Yes   Not selected:    No, not that I know of   UTIs may be more serious when other factors are present. Let's address those now. Are you being treated for type 1 or type 2 diabetes? Select one.    Yes   Not selected:    No   Have you had a hemoglobin A1C (HbA1c) test in the last 6 months? Select one.    Yes   Not selected:    No   What was the result of your last HbA1c test? Select one.    7% or lower   Not selected:    Above 7%    I'm not sure   Has your diabetes caused any of these complications? Select all that apply.    None of these   Not selected:    Eye disease (retinopathy)    Nerve damage (neuropathy)    Kidney disease/damage (nephropathy)    Heart disease   Are you taking any of these medications as part of your diabetes treatment plan? - Canagliflozin (Invokana, Invokamet, Invokamet XR) - Dapagliflozin (Farxiga, Xigduo XR, Qtern) - Empagliflozin (Jardiance, Glyxambi,  Synjardy, Synjardy XR, Trijardy XR) - Ertugliflozin (Steglatro, Segluromet, Steglujan) Select one.    No   Not selected:    Yes   Do you have any of these conditions that can affect the immune system? Scroll to see all options. Select all that apply.    None of these   Not selected:    History of bone marrow transplant    Chronic kidney disease    Chronic liver disease (including cirrhosis)    HIV/AIDS    Inflammatory bowel disease (Crohn's disease or ulcerative colitis)    Lupus    Moderate to severe plaque psoriasis    Multiple sclerosis    Rheumatoid arthritis    Sickle cell anemia    Alpha or beta thalassemia    History of solid organ transplant (kidney, liver, or heart)    History of spleen removal    An autoimmune disorder not listed here (specify)    A condition requiring treatment with long-term use of oral steroids (such as prednisone, prednisolone, or dexamethasone) (specify)   Have you ever been diagnosed with cancer? Select one.    No   Not selected:    Yes, I have cancer now    Yes, but I'm in remission   These last few questions will help us create the right treatment plan for you. Are you being treated for any of these conditions? Select all that apply.    High blood pressure   Not selected:    Matthew-Danlos syndrome    Folate deficiency    G6PD deficiency    History of aortic aneurysm or dissection    Marfan syndrome    Megaloblastic anemia    Mono (mononucleosis)    Myasthenia gravis    Oliguria or anuria    Peripheral vascular disease    No   Have you ever had jaundice as a result of taking amoxicillin-clavulanate (Augmentin) or nitrofurantoin (Macrobid)? Select all that apply.    No   Not selected:    Yes, from amoxicillin-clavulanate (Augmentin)    Yes, from nitrofurantoin (Macrobid, Macrodantin)   Are you taking any of these medications? Select all that apply.    An ACE inhibitor such as lisinopril, enalapril, captopril, or benazepril   Not selected:    An angiotensin II receptor blocker (ARB)  "such as candesartan, irbesartan, losartan, or valsartan    No   Are you still taking these medications listed in your medical record? If you're not taking any of these, click Next. Select all that apply.    CINNAMON PO    Cranberry 500 MG capsule    atorvastatin 10 MG tablet    meloxicam 15 MG tablet    omeprazole 20 MG capsule    freestyle lancets    Zinc 50 MG tablet    cetirizine 10 MG tablet    fish oil 1000 MG capsule capsule    lisinopril 20 MG tablet    glipizide 5 MG ER tablet    Alcohol Pads 70 % pads    aspirin 81 MG EC tablet    metFORMIN  MG 24 hr tablet    FREESTYLE LITE test strip    hydroCHLOROthiazide 25 MG tablet   Are you taking any other medications, vitamins, or supplements? Select one.    No   Not selected:    Yes   Have you ever had an allergic or bad reaction to any medication? Select one.    Yes   Not selected:    No   Have you had an allergic or bad reaction to any of these medications? Select all that apply.    No, not that I know of   Not selected:    Any antibiotic starting with \"cef-,\" such as cefazolin, cefdinir, cefuroxime, ceftriaxone, ceftazidime, cefepime, or cephalexin (brands include Fortaz and Keflex)    Any antibiotic ending with \"-floxacin,\" such as ciprofloxacin, gemifloxacin, levofloxacin, moxifloxacin, or ofloxacin (brands include Cipro, Factive, Floxin, and Levaquin)    Any antibiotic ending with \"-cillin,\" such as penicillin, amoxicillin, ampicillin, dicloxacillin, nafcillin, or piperacillin (brands include Augmentin, Unasyn, and Zosyn)    Nitrofurantoin (brands include Furadantin, Macrobid and Macrodantin)    Sulfamethoxazole-trimethoprim (brands include Bactrim and Septra) or any other sulfa drug    Fluconazole (brand name Diflucan), itraconazole, or terconazole    Trimethoprim (brand name Primsol)   Have you had an allergic or bad reaction to any of these medications? Select all that apply.    No   Not selected:    Acetaminophen (Tylenol)    Ibuprofen (Advil, " Midol, Motrin)    Phenazopyridine (Azo, Baridium, Pyridium, Uricalm, Uristat)   Do you need a doctor's note? A doctor's note confirms that you received care today and states when you can return to school or work. It does not contain information about your diagnosis or treatment plan. Your provider will make the final decision on whether to give you a doctor's note. Doctor's notes CANNOT be backdated. Select one.    No   Not selected:    Today only (1 day)    Today and tomorrow (2 days)    3 days   Is there anything you'd like to add about your symptoms? Please limit your comments to the symptoms asked about in this interview. If you include comments about other concerns, your provider may recommend that you be seen in person.   The patient did not enter any additional information.   ----------   Medical history   Medical history data does not currently exist for this patient.

## 2023-09-09 NOTE — EXTERNAL PATIENT INSTRUCTIONS
[image:  data:image/svg+xml;base64,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]   Note   Drink plenty of water and avoid caffeine If symptoms do not improve in 3-5 days follow up with your primary care provider or urgent care   [image:  data:image/svg+xml;base64,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 MFJlDF23MSCkKEM9DlG0MhpnANDmCuW9ZDZlDBRoQoNfCUJiZeF5Ko21F1WhmNq+ZxrnOOutMLUcMNdkUCVdu6KxSlZqfDSclt1oi9Ysh1GkVWF1qc0pwPNyo7VqjFmxgECvSQD8Rx6gYG89SYJ4MIBuYgHqUhgvEB92KTYuRAEwZkNwCD7vCIVuRDUeIl58O2OcqWv+UjgnPSytFMUsCTewBFZiAAdmPaZunXDdnd5ve9Dsp7AzTDA7xh4tdLjtENletTBiJOE2ju0qIY85hGPfECAcSF7qCUPvUJH3RkWqGMDbGFSeQP6fRmLrOC37ASWpXRNbYWUuZd56QCE5JBC1ZUI9JkWcJAzcObAcCSPxCM0aSuZrUH35RHOaNpH6IL15KPBvMXH3GbIbSEAnVfN3Qs38HxKlWN68YihoBS0uIZJcOvowJP26RlVsUzldQHswUGZdGh85IEX9XLDzWDV4cjK+AA2uZBWxRwa8R6K6Tr2=]   Diagnosis   Urinary tract infection (UTI)   My name is PATRICIA Gan. I'm a healthcare provider at [facility]. After reviewing your interview, I see you have a urinary tract infection (UTI).   [image:  data:image/svg+xml;base64,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]   Medications   Your pharmacy   [facility] [phone] [facility] [address] [facility] [address] [phone]     Prescription   Nitrofurantoin  monohydrate/macrocrystalline (100mg): Take 1 capsule by mouth every 12 hours for 5 days for infection. This medication is an antibiotic. Take it exactly as directed. You must finish the entire course of medication, even if you feel better after taking the first few doses.   Fluconazole (150mg): Take 1 tablet by mouth once for 1 day as a single dose. Use this medication only if you develop a yeast infection. If yeast infection symptoms are still present 3 days after taking the first tablet, take the second tablet.    Because you've developed yeast infections after taking antibiotics in the past, I've prescribed Diflucan (fluconazole). Diflucan is an oral antifungal that treats yeast infections. Use this medication only if you develop a yeast infection.   [image:  data:image/svg+xml;base64,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]   About your diagnosis   A UTI is an infection of one or more parts of the urinary tract, most commonly the bladder.   Most UTIs are caused by bacteria (usually E. coli) that travel up the urethra and into the bladder. I see that you have some common signs and symptoms of a UTI:    Pain or burning while urinating    Frequent urination    Sudden urge to urinate    Symptoms that feel a lot like  past UTIs    Mild or moderate pain, pressure, or discomfort in your lower abdomen    Strange or strong smelling urine    Symptoms that began shortly after sexual intercourse   Fortunately, most UTIs aren't serious, and they're easily treated with antibiotics. Make sure you take all of the antibiotic pills given to you, even if you start to feel better after the first few doses. Otherwise, the UTI might come back.   [image:  data:image/svg+xml;base64,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]   What to  expect   If you follow this treatment plan, you should start to feel better within 1 to 2 days.   [image: data:image/svg+xml;base64,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]   When to seek care   Call us at [phone]   with any sudden or unexpected symptoms.    Symptoms that don't improve or get worse in the next 48 hours    Fever that goes above 101F or lasts longer than 24 hours    Shaking or chills    Nausea or vomiting    Severe flank pain (pain in your back or side)   [image:  data:image/svg+xml;base64,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]   Other treatment   Diabetes can put you at a higher risk for UTIs. High blood sugars allow bacteria to grow more easily. Make sure that you:    Keep your blood sugars under control    Closely follow your diabetes management plan    Call your healthcare provider if you have any trouble controlling your blood sugar   Early diagnosis and treatment are important to prevent more serious infections.    Rest and drink plenty of water    Urinate frequently and when you first feel the urge    Place a heating pad on your back or stomach to help relieve some of the discomfort   [image:  data:image/svg+xml;base64,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]   Prevention    Drink a lot of liquids to help flush bacteria from your system. Water is best. Try for six to eight, 8-ounce glasses a day on a regular basis.    Urinate often and when you first feel the urge. Bacteria can grow when urine stays in the bladder too long. Urinate after sex to flush away bacteria.    After using the toilet, always wipe from front to back. This step is most important after a bowel movement. Wiping from front to back prevents bacteria normally found in stool from entering the urinary tract.   [image:  data:image/svg+xml;base64,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]   Your provider   Your diagnosis was provided by PATRICIA Gan, a member of your trusted care team at [facility].   If you have any questions, call us at [phone]  .

## 2023-10-04 ENCOUNTER — OFFICE VISIT (OUTPATIENT)
Dept: FAMILY MEDICINE CLINIC | Facility: CLINIC | Age: 58
End: 2023-10-04
Payer: COMMERCIAL

## 2023-10-04 VITALS
HEART RATE: 70 BPM | HEIGHT: 71 IN | RESPIRATION RATE: 20 BRPM | OXYGEN SATURATION: 100 % | DIASTOLIC BLOOD PRESSURE: 69 MMHG | TEMPERATURE: 97.3 F | WEIGHT: 293 LBS | BODY MASS INDEX: 41.02 KG/M2 | SYSTOLIC BLOOD PRESSURE: 114 MMHG

## 2023-10-04 DIAGNOSIS — I10 ESSENTIAL (PRIMARY) HYPERTENSION: ICD-10-CM

## 2023-10-04 DIAGNOSIS — E11.9 TYPE 2 DIABETES MELLITUS WITHOUT COMPLICATION, WITHOUT LONG-TERM CURRENT USE OF INSULIN: Primary | ICD-10-CM

## 2023-10-04 DIAGNOSIS — E78.2 MIXED HYPERLIPIDEMIA: ICD-10-CM

## 2023-10-04 DIAGNOSIS — E66.01 CLASS 3 SEVERE OBESITY DUE TO EXCESS CALORIES WITH SERIOUS COMORBIDITY AND BODY MASS INDEX (BMI) OF 45.0 TO 49.9 IN ADULT: ICD-10-CM

## 2023-10-04 DIAGNOSIS — K21.9 GASTROESOPHAGEAL REFLUX DISEASE WITHOUT ESOPHAGITIS: ICD-10-CM

## 2023-10-04 PROBLEM — E66.813 CLASS 3 SEVERE OBESITY DUE TO EXCESS CALORIES WITH SERIOUS COMORBIDITY AND BODY MASS INDEX (BMI) OF 45.0 TO 49.9 IN ADULT: Status: RESOLVED | Noted: 2021-06-03 | Resolved: 2023-10-04

## 2023-10-04 PROCEDURE — 99214 OFFICE O/P EST MOD 30 MIN: CPT | Performed by: NURSE PRACTITIONER

## 2023-10-04 RX ORDER — GLIPIZIDE 5 MG/1
5 TABLET, FILM COATED, EXTENDED RELEASE ORAL DAILY
Qty: 90 TABLET | Refills: 1 | Status: SHIPPED | OUTPATIENT
Start: 2023-10-04

## 2023-10-04 NOTE — PROGRESS NOTES
Chief Complaint  Diabetes, Hypertension, and Hyperlipidemia    Jun Flores is a 58 y.o. female  who presents to [facility]     Patient Care Team:  Bhumika Dunaway APRN as PCP - General (Family Medicine)     History of Present Illness  Patient presents for follow-up of diabetes  This is an established problem  Interim treatment changes: Increased metformin  mg from once daily to BID on 6/30/2023  Current medications: metformin  mg BID and glipizide if blood sugar > 160  hgba1c 7.6 % on 6/30/23  Last eye exam 12/1/22 [facility] in [place] - no retinopathy  Checks sugar daily  Fasting sugars run 118 to 170  Sugars are higher when she does not sleep well and if stressed     Patient presents for follow-up of HTN  This is an established problem  Interim treatment changes: changed to lisinopril since accupril is no longer being made on 6/30/23  Current medications: lisinopril 20 mg daily and HCTZ 25 mg daily    Patient presents for follow-up of hyperlipidemia  This is an established problem  Interim treatment changes: none  Current medications: atorvastatin 10 mg daily  Last lipid panel 7/11/2023     Patient presents for follow-up of GERD  This is an established problem  Interim treatment changes: none  Current medications: omeprazole 20 mg every 3 days  Controlled symptoms on medication.    Diabetes  She has type 2 diabetes mellitus. No MedicAlert identification noted. Pertinent negatives for hypoglycemia include no confusion, dizziness, headaches, nervousness/anxiousness, speech difficulty, sweats or tremors. Associated symptoms include weight loss. Pertinent negatives for diabetes include no blurred vision, no chest pain, no foot paresthesias, no foot ulcerations, no polydipsia, no polyphagia and no polyuria. Pertinent negatives for hypoglycemia complications include no blackouts, no hospitalization, no nocturnal hypoglycemia, no required assistance and  no required glucagon injection. Symptoms are stable. She is compliant with treatment all of the time. She is following a diabetic diet. Meal planning includes ADA exchanges, avoidance of concentrated sweets and carbohydrate counting. She rarely participates in exercise. She monitors blood glucose at home 1-2 x per day. Blood glucose monitoring compliance is excellent. Her overall blood glucose range is 110-130 mg/dl. She does not see a podiatrist.Eye exam is current.       Sandra Lopez  has a past medical history of Allergic rhinitis, Diabetes mellitus, Hyperlipidemia, Hypertension, and Obesity.      Review of Systems   Constitutional:  Positive for weight loss. Negative for fever.   HENT:  Negative for ear pain, sore throat, tinnitus and trouble swallowing.    Eyes:  Negative for blurred vision.   Respiratory:  Negative for cough and shortness of breath.    Cardiovascular:  Positive for leg swelling. Negative for chest pain and palpitations.   Gastrointestinal:  Negative for abdominal pain, blood in stool, nausea and vomiting.        + stable GERD   Endocrine: Negative for polydipsia, polyphagia and polyuria.   Skin:  Negative for rash.   Neurological:  Negative for dizziness, tremors, speech difficulty and headaches.   Psychiatric/Behavioral:  Negative for confusion, dysphoric mood and suicidal ideas. The patient is not nervous/anxious.         + stress      Family History   Problem Relation Age of Onset    Diabetes Mother     Hyperlipidemia Mother     Hypertension Mother     Hypertension Maternal Grandmother     Goiter Maternal Grandmother     Rheum arthritis Maternal Grandfather     Diabetes Paternal Grandmother     Breast cancer Paternal Grandmother     Aneurysm Paternal Grandfather     No Known Problems Daughter         Past Surgical History:   Procedure Laterality Date    BREAST BIOPSY Left 2008    in [place]    HYSTERECTOMY  2008    heavy bleeding    OOPHORECTOMY Bilateral         Social History      Socioeconomic History    Marital status:      Spouse name: Dex    Number of children: 1    Highest education level: Bachelor's degree (e.g., BA, AB, BS)   Tobacco Use    Smoking status: Never    Smokeless tobacco: Never   Vaping Use    Vaping Use: Never used   Substance and Sexual Activity    Alcohol use: Not Currently    Drug use: Never    Sexual activity: Yes     Partners: Male     Birth control/protection: Hysterectomy        Immunization History   Administered Date(s) Administered    COVID-19 (PFIZER) BIVALENT 12+YRS 10/03/2022    COVID-19 (PFIZER) Purple Cap Monovalent 01/04/2021, 01/25/2021, 10/04/2021    Covid-19 (Pfizer) Gray Cap Monovalent 08/02/2022    Flublok 18+yrs 10/02/2023    Hepatitis A 04/19/2018, 11/06/2018    Influenza, Unspecified 10/20/2018, 10/17/2019, 10/02/2020, 11/07/2022    Shingrix 06/28/2021, 07/05/2023    Tdap 06/30/2023       Objective       Current Outpatient Medications:     Alcohol Swabs (Alcohol Pads) 70 % pads, Use as directed, three times daily as needed., Disp: 200 each, Rfl: 5    aspirin 81 MG EC tablet, Take 1 tablet by mouth Daily., Disp: , Rfl:     atorvastatin (LIPITOR) 10 MG tablet, Take 1 tablet by mouth Daily., Disp: 90 tablet, Rfl: 2    cetirizine (zyrTEC) 10 MG tablet, Take 1 tablet by mouth Daily., Disp: , Rfl:     CINNAMON PO, Take 2,000 mg by mouth Daily., Disp: , Rfl:     Cranberry 500 MG capsule, Take 1,500 mg by mouth Daily., Disp: , Rfl:     EPINEPHrine (EpiPen 2-Louie) 0.3 MG/0.3ML solution auto-injector injection, Inject 0.3 mL into the appropriate muscle as directed by prescriber 1 (One) Time As Needed (allergic reaction) for up to 1 dose., Disp: 2 each, Rfl: 5    glipizide (GLUCOTROL XL) 5 MG ER tablet, Take 1 tablet by mouth Daily., Disp: 90 tablet, Rfl: 1    glucose blood (FREESTYLE LITE) test strip, 1 each., Disp: , Rfl:     hydroCHLOROthiazide (HYDRODIURIL) 25 MG tablet, Take 1 tablet by mouth Daily, Disp: 90 tablet, Rfl: 1    Lancets  "(FREESTYLE) lancets, Test 3 times daily as needed, Disp: 200 each, Rfl: 5    lisinopril (PRINIVIL,ZESTRIL) 20 MG tablet, Take 1 tablet by mouth Daily., Disp: 90 tablet, Rfl: 1    meloxicam (MOBIC) 15 MG tablet, Take 1 tablet by mouth Daily, Disp: 90 tablet, Rfl: 1    metFORMIN ER (GLUCOPHAGE-XR) 500 MG 24 hr tablet, Take 1 tablet by mouth 2 (Two) Times a Day., Disp: 180 tablet, Rfl: 2    Omega-3 Fatty Acids (fish oil) 1000 MG capsule capsule, Take 2 capsules by mouth Daily With Breakfast., Disp: , Rfl:     omeprazole (priLOSEC) 20 MG capsule, Take 1 capsule by mouth Daily., Disp: 90 capsule, Rfl: 3    Zoster Vac Recomb Adjuvanted (Shingrix) 50 MCG/0.5ML reconstituted suspension, Inject  into the appropriate muscle as directed by prescriber. (Patient not taking: Reported on 10/4/2023), Disp: 0.5 mL, Rfl: 0    Vital Signs:      /69   Pulse 70   Temp 97.3 °F (36.3 °C) (Infrared)   Resp 20   Ht 180.3 cm (70.98\")   Wt (!) 153 kg (338 lb 6.4 oz)   SpO2 100%   BMI 47.22 kg/m²     Vitals:    10/04/23 1551   BP: 114/69   Pulse: 70   Resp: 20   Temp: 97.3 °F (36.3 °C)   TempSrc: Infrared   SpO2: 100%   Weight: (!) 153 kg (338 lb 6.4 oz)   Height: 180.3 cm (70.98\")      Physical Exam  Vitals reviewed.   Constitutional:       General: She is not in acute distress.     Appearance: Normal appearance. She is obese.   HENT:      Head: Normocephalic and atraumatic.      Right Ear: Tympanic membrane, ear canal and external ear normal.      Left Ear: Tympanic membrane, ear canal and external ear normal.      Mouth/Throat:      Mouth: Mucous membranes are moist.      Pharynx: Oropharynx is clear.   Eyes:      General: No scleral icterus.     Conjunctiva/sclera: Conjunctivae normal.   Cardiovascular:      Rate and Rhythm: Normal rate and regular rhythm.      Pulses:           Dorsalis pedis pulses are 2+ on the right side and 2+ on the left side.        Posterior tibial pulses are 2+ on the right side and 2+ on the left " side.      Heart sounds: Normal heart sounds.   Pulmonary:      Effort: Pulmonary effort is normal. No respiratory distress.      Breath sounds: Normal breath sounds. No wheezing.   Musculoskeletal:      Cervical back: Neck supple.      Right lower leg: No edema.      Left lower leg: No edema.      Right foot: Normal range of motion. No deformity, bunion or Charcot foot.      Left foot: Normal range of motion. No deformity, bunion or Charcot foot.   Feet:      Right foot:      Protective Sensation: 10 sites tested.  10 sites sensed.      Skin integrity: Skin integrity normal. No erythema or warmth.      Toenail Condition: Right toenails are normal.      Left foot:      Protective Sensation: 10 sites tested.  10 sites sensed.      Skin integrity: Skin integrity normal. No erythema or warmth.      Toenail Condition: Left toenails are normal.      Comments: Diabetic Foot Exam Performed and Monofilament Test Performed  Lymphadenopathy:      Cervical: No cervical adenopathy.   Skin:     General: Skin is warm and dry.   Neurological:      Mental Status: She is alert and oriented to person, place, and time.   Psychiatric:         Mood and Affect: Mood normal.      Result Review :   The following data was reviewed by: PATRICIA Jasso on 10/04/2023:             Lab on 07/11/2023   Component Date Value Ref Range Status    TSH 07/11/2023 1.620  0.270 - 4.200 uIU/mL Final    Glucose 07/11/2023 157 (H)  65 - 99 mg/dL Final    BUN 07/11/2023 19  6 - 20 mg/dL Final    Creatinine 07/11/2023 0.85  0.57 - 1.00 mg/dL Final    Sodium 07/11/2023 144  136 - 145 mmol/L Final    Potassium 07/11/2023 3.9  3.5 - 5.2 mmol/L Final    Chloride 07/11/2023 105  98 - 107 mmol/L Final    CO2 07/11/2023 25.6  22.0 - 29.0 mmol/L Final    Calcium 07/11/2023 10.1  8.6 - 10.5 mg/dL Final    Total Protein 07/11/2023 6.3  6.0 - 8.5 g/dL Final    Albumin 07/11/2023 4.2  3.5 - 5.2 g/dL Final    ALT (SGPT) 07/11/2023 35 (H)  1 - 33 U/L Final    AST  (SGOT) 07/11/2023 25  1 - 32 U/L Final    Alkaline Phosphatase 07/11/2023 48  39 - 117 U/L Final    Total Bilirubin 07/11/2023 1.9 (H)  0.0 - 1.2 mg/dL Final    Globulin 07/11/2023 2.1  gm/dL Final    A/G Ratio 07/11/2023 2.0  g/dL Final    BUN/Creatinine Ratio 07/11/2023 22.4  7.0 - 25.0 Final    Anion Gap 07/11/2023 13.4  5.0 - 15.0 mmol/L Final    eGFR 07/11/2023 79.0  >60.0 mL/min/1.73 Final    Total Cholesterol 07/11/2023 156  0 - 200 mg/dL Final    Triglycerides 07/11/2023 105  0 - 150 mg/dL Final    HDL Cholesterol 07/11/2023 47  40 - 60 mg/dL Final    LDL Cholesterol  07/11/2023 90  0 - 100 mg/dL Final    VLDL Cholesterol 07/11/2023 19  5 - 40 mg/dL Final    LDL/HDL Ratio 07/11/2023 1.87   Final    WBC 07/11/2023 5.30  3.40 - 10.80 10*3/mm3 Final    RBC 07/11/2023 4.94  3.77 - 5.28 10*6/mm3 Final    Hemoglobin 07/11/2023 14.2  12.0 - 15.9 g/dL Final    Hematocrit 07/11/2023 43.6  34.0 - 46.6 % Final    MCV 07/11/2023 88.3  79.0 - 97.0 fL Final    MCH 07/11/2023 28.7  26.6 - 33.0 pg Final    MCHC 07/11/2023 32.6  31.5 - 35.7 g/dL Final    RDW 07/11/2023 12.9  12.3 - 15.4 % Final    RDW-SD 07/11/2023 41.2  37.0 - 54.0 fl Final    MPV 07/11/2023 11.5  6.0 - 12.0 fL Final    Platelets 07/11/2023 171  140 - 450 10*3/mm3 Final    nRBC 07/11/2023 0.0  0.0 - 0.2 /100 WBC Final    Neutrophil % 07/11/2023 46.0  42.7 - 76.0 % Final    Lymphocyte % 07/11/2023 44.0  19.6 - 45.3 % Final    Monocyte % 07/11/2023 6.0  5.0 - 12.0 % Final    Eosinophil % 07/11/2023 3.0  0.3 - 6.2 % Final    Basophil % 07/11/2023 1.0  0.0 - 1.5 % Final    Neutrophils Absolute 07/11/2023 2.44  1.70 - 7.00 10*3/mm3 Final    Lymphocytes Absolute 07/11/2023 2.33  0.70 - 3.10 10*3/mm3 Final    Monocytes Absolute 07/11/2023 0.32  0.10 - 0.90 10*3/mm3 Final    Eosinophils Absolute 07/11/2023 0.16  0.00 - 0.40 10*3/mm3 Final    Basophils Absolute 07/11/2023 0.05  0.00 - 0.20 10*3/mm3 Final    RBC Morphology 07/11/2023 Normal  Normal Final    WBC  Morphology 07/11/2023 Normal  Normal Final    Platelet Morphology 07/11/2023 Normal  Normal Final    Microalbumin/Creatinine Ratio 07/11/2023 8.8  mg/g Final    Creatinine, Urine 07/11/2023 171.1  mg/dL Final    Microalbumin, Urine 07/11/2023 1.5  mg/dL Final          Assessment and Plan    Diagnoses and all orders for this visit:    1. Type 2 diabetes mellitus without complication, without long-term current use of insulin (Primary)  Overview:  Hgba1c  7.6 % on 6/30/23    Assessment & Plan:  Chronic problem.  Not to goal.   She states her insurance will only cover an hgba1c every 6 months.    Home sugars are high.  Continue metformin  mg BID.  Advised to take the glipizide 5 mg daily (and not prn)    Orders:  -     glipizide (GLUCOTROL XL) 5 MG ER tablet; Take 1 tablet by mouth Daily.  Dispense: 90 tablet; Refill: 1    2. Essential (primary) hypertension  Assessment & Plan:  Chronic stable problem.  Continue Lisinopril 20 mg daily and HCTZ 25 mg daily      3. Mixed hyperlipidemia  Assessment & Plan:  Chronic stable problem.  Continue atorvastatin 10 mg daily.  Recheck lipid panel in July 2024.      4. Gastroesophageal reflux disease without esophagitis  Assessment & Plan:  Continue omeprazole 20 mg daily as needed      5. Class 3 severe obesity due to excess calories with serious comorbidity and body mass index (BMI) of 45.0 to 49.9 in adult  Assessment & Plan:  Patient's (Body mass index is 47.22 kg/m².) indicates that they are morbidly/severely obese (BMI > 40 or > 35 with obesity - related health condition) with health conditions that include hypertension, diabetes mellitus, dyslipidemias, and GERD . Weight is improving with lifestyle modifications. BMI  is above average; BMI management plan is completed. We discussed portion control and increasing exercise.                Follow Up   Return in about 3 months (around 1/4/2024) for diabetes.  Patient was given instructions and counseling regarding her  condition or for health maintenance advice. Please see specific information pulled into the AVS if appropriate.    There are no Patient Instructions on file for this visit.

## 2023-10-07 NOTE — ASSESSMENT & PLAN NOTE
Patient's (Body mass index is 47.22 kg/m².) indicates that they are morbidly/severely obese (BMI > 40 or > 35 with obesity - related health condition) with health conditions that include hypertension, diabetes mellitus, dyslipidemias, and GERD . Weight is improving with lifestyle modifications. BMI  is above average; BMI management plan is completed. We discussed portion control and increasing exercise.

## 2023-10-07 NOTE — ASSESSMENT & PLAN NOTE
Chronic problem.  Not to goal.   She states her insurance will only cover an hgba1c every 6 months.    Home sugars are high.  Continue metformin  mg BID.  Advised to take the glipizide 5 mg daily (and not prn)

## 2023-12-08 ENCOUNTER — HOSPITAL ENCOUNTER (OUTPATIENT)
Dept: MAMMOGRAPHY | Facility: HOSPITAL | Age: 58
Discharge: HOME | End: 2023-12-08
Admitting: NURSE PRACTITIONER
Payer: COMMERCIAL

## 2023-12-08 PROCEDURE — 77063 BREAST TOMOSYNTHESIS BI: CPT

## 2023-12-08 PROCEDURE — 77067 SCR MAMMO BI INCL CAD: CPT

## 2023-12-25 DIAGNOSIS — K21.9 GASTROESOPHAGEAL REFLUX DISEASE WITHOUT ESOPHAGITIS: ICD-10-CM

## 2023-12-25 DIAGNOSIS — E78.2 MIXED HYPERLIPIDEMIA: ICD-10-CM

## 2023-12-25 DIAGNOSIS — M25.561 ARTHRALGIA OF RIGHT KNEE: ICD-10-CM

## 2023-12-25 DIAGNOSIS — I10 ESSENTIAL (PRIMARY) HYPERTENSION: ICD-10-CM

## 2023-12-25 RX ORDER — LISINOPRIL 20 MG/1
20 TABLET ORAL DAILY
Qty: 90 TABLET | Refills: 0 | Status: SHIPPED | OUTPATIENT
Start: 2023-12-25

## 2023-12-26 RX ORDER — MELOXICAM 15 MG/1
15 TABLET ORAL DAILY
Qty: 90 TABLET | Refills: 0 | Status: SHIPPED | OUTPATIENT
Start: 2023-12-26

## 2023-12-26 RX ORDER — OMEPRAZOLE 20 MG/1
20 CAPSULE, DELAYED RELEASE ORAL DAILY
Qty: 90 CAPSULE | Refills: 3 | Status: SHIPPED | OUTPATIENT
Start: 2023-12-26

## 2023-12-26 RX ORDER — HYDROCHLOROTHIAZIDE 25 MG/1
25 TABLET ORAL DAILY
Qty: 90 TABLET | Refills: 0 | Status: SHIPPED | OUTPATIENT
Start: 2023-12-26

## 2023-12-26 RX ORDER — ATORVASTATIN CALCIUM 10 MG/1
10 TABLET, FILM COATED ORAL DAILY
Qty: 90 TABLET | Refills: 2 | Status: SHIPPED | OUTPATIENT
Start: 2023-12-26

## 2024-03-20 ENCOUNTER — OFFICE VISIT (OUTPATIENT)
Dept: FAMILY MEDICINE CLINIC | Facility: CLINIC | Age: 59
End: 2024-03-20
Payer: COMMERCIAL

## 2024-03-20 VITALS
BODY MASS INDEX: 41.02 KG/M2 | SYSTOLIC BLOOD PRESSURE: 138 MMHG | WEIGHT: 293 LBS | HEART RATE: 88 BPM | TEMPERATURE: 97.3 F | OXYGEN SATURATION: 98 % | HEIGHT: 71 IN | RESPIRATION RATE: 20 BRPM | DIASTOLIC BLOOD PRESSURE: 85 MMHG

## 2024-03-20 DIAGNOSIS — K21.9 GASTROESOPHAGEAL REFLUX DISEASE WITHOUT ESOPHAGITIS: ICD-10-CM

## 2024-03-20 DIAGNOSIS — E11.9 TYPE 2 DIABETES MELLITUS WITHOUT COMPLICATION, WITHOUT LONG-TERM CURRENT USE OF INSULIN: Primary | ICD-10-CM

## 2024-03-20 DIAGNOSIS — I10 ESSENTIAL (PRIMARY) HYPERTENSION: ICD-10-CM

## 2024-03-20 DIAGNOSIS — E78.2 MIXED HYPERLIPIDEMIA: ICD-10-CM

## 2024-03-20 DIAGNOSIS — Z12.11 COLON CANCER SCREENING: ICD-10-CM

## 2024-03-20 LAB
EXPIRATION DATE: ABNORMAL
HBA1C MFR BLD: 6.3 % (ref 4.5–5.7)
Lab: ABNORMAL

## 2024-03-20 NOTE — PROGRESS NOTES
Chief Complaint  Diabetes, Hypertension, and Hyperlipidemia    Jun Flores is a 58 y.o. female  who presents to [facility]     Patient Care Team:  Bhumika Dunaway APRN as PCP - General (Family Medicine)     History of Present Illness  Patient presents for follow-up of diabetes  This is an established problem  Interim treatment changes: Advised to take the glipizide 5 mg daily (and not prn) on 10/4/2023  Current medications: metformin  mg BID and glipizide 5 mg daily (and not prn)  hgba1c 7.6 % on 6/30/23  Checks blood sugar at home ? Yes   Last diabetic eye exam ? December 2023 at [facility] in [place]    ++++++++++++++++++++++++    Patient presents for follow-up of HTN  This is an established problem  Interim treatment changes: none  Current medications: lisinopril 20 mg daily and HCTZ 25 mg daily     ++++++++++++++++++++++++    Patient presents for follow-up of hyperlipidemia  This is an established problem  Interim treatment changes: none  Current medications: atorvastatin 10 mg daily  Last lipid panel 7/11/2023    ++++++++++++++++++++++++     Patient presents for follow-up of GERD  This is an established problem  Interim treatment changes: none  Current medications: omeprazole 20 mg every 3 days  Controlled symptoms on medication every 3 days.      Sandra Lopez  has a past medical history of Allergic rhinitis, Diabetes mellitus, GERD (gastroesophageal reflux disease), Hyperlipidemia, Hypertension, and Obesity.      Review of Systems   Constitutional:  Negative for fever.   Respiratory:  Negative for cough and shortness of breath.    Cardiovascular:  Negative for chest pain, palpitations and leg swelling.   Endocrine: Negative for polydipsia and polyuria.   Neurological:  Negative for tremors, speech difficulty and headaches.   Psychiatric/Behavioral:  Negative for confusion.         Family History   Problem Relation Age of Onset    Diabetes Mother      Hyperlipidemia Mother     Hypertension Mother     Hypertension Maternal Grandmother     Goiter Maternal Grandmother     Rheum arthritis Maternal Grandfather     Diabetes Paternal Grandmother     Breast cancer Paternal Grandmother     Aneurysm Paternal Grandfather     No Known Problems Daughter         Past Surgical History:   Procedure Laterality Date    BREAST BIOPSY Left 2008    in [place]    HYSTERECTOMY  2008    heavy bleeding    OOPHORECTOMY Bilateral         Social History     Socioeconomic History    Marital status:      Spouse name: Dex    Number of children: 1    Highest education level: Bachelor's degree (e.g., BA, AB, BS)   Tobacco Use    Smoking status: Never    Smokeless tobacco: Never   Vaping Use    Vaping status: Never Used   Substance and Sexual Activity    Alcohol use: Not Currently    Drug use: Never    Sexual activity: Yes     Partners: Male     Birth control/protection: Hysterectomy        Immunization History   Administered Date(s) Administered    COVID-19 (PFIZER) BIVALENT 12+YRS 10/03/2022    COVID-19 (PFIZER) Purple Cap Monovalent 01/04/2021, 01/25/2021, 10/04/2021    COVID-19 F23 (PFIZER) 12YRS+ (COMIRNATY) 01/08/2024    Covid-19 (Pfizer) Gray Cap Monovalent 08/02/2022    Flublok 18+yrs 10/02/2023    Hepatitis A 04/19/2018, 11/06/2018    Influenza, Unspecified 10/20/2018, 10/17/2019, 10/02/2020, 11/07/2022    Shingrix 06/28/2021, 07/05/2023    Tdap 06/30/2023       Objective       Current Outpatient Medications:     Alcohol Swabs (Alcohol Pads) 70 % pads, Use as directed, three times daily as needed., Disp: 200 each, Rfl: 5    aspirin 81 MG EC tablet, Take 1 tablet by mouth Daily., Disp: , Rfl:     atorvastatin (LIPITOR) 10 MG tablet, Take 1 tablet by mouth Daily., Disp: 90 tablet, Rfl: 2    cetirizine (zyrTEC) 10 MG tablet, Take 1 tablet by mouth Daily., Disp: , Rfl:     CINNAMON PO, Take 2,000 mg by mouth Daily., Disp: , Rfl:     Cranberry 500 MG capsule, Take 1,500 mg by  "mouth Daily., Disp: , Rfl:     EPINEPHrine (EpiPen 2-Louie) 0.3 MG/0.3ML solution auto-injector injection, Inject 0.3 mL into the appropriate muscle as directed by prescriber 1 (One) Time As Needed (allergic reaction) for up to 1 dose., Disp: 2 each, Rfl: 5    glipizide (GLUCOTROL XL) 5 MG ER tablet, Take 1 tablet by mouth Daily., Disp: 90 tablet, Rfl: 1    glucose blood (FREESTYLE LITE) test strip, 1 each., Disp: , Rfl:     hydroCHLOROthiazide (HYDRODIURIL) 25 MG tablet, Take 1 tablet by mouth Daily, Disp: 90 tablet, Rfl: 0    Lancets (FREESTYLE) lancets, Test 3 times daily as needed, Disp: 200 each, Rfl: 5    lisinopril (PRINIVIL,ZESTRIL) 20 MG tablet, Take 1 tablet by mouth Daily., Disp: 90 tablet, Rfl: 0    meloxicam (MOBIC) 15 MG tablet, Take 1 tablet by mouth Daily, Disp: 90 tablet, Rfl: 0    metFORMIN ER (GLUCOPHAGE-XR) 500 MG 24 hr tablet, Take 1 tablet by mouth 2 (Two) Times a Day., Disp: 180 tablet, Rfl: 2    Omega-3 Fatty Acids (fish oil) 1000 MG capsule capsule, Take 2 capsules by mouth Daily With Breakfast., Disp: , Rfl:     omeprazole (priLOSEC) 20 MG capsule, Take 1 capsule by mouth Daily., Disp: 90 capsule, Rfl: 3    Vital Signs:      /85   Pulse 88   Temp 97.3 °F (36.3 °C) (Temporal)   Resp 20   Ht 180.3 cm (70.98\")   Wt (!) 155 kg (341 lb 6.4 oz)   SpO2 98%   BMI 47.64 kg/m²     Vitals:    03/20/24 1330   BP: 138/85   Pulse: 88   Resp: 20   Temp: 97.3 °F (36.3 °C)   TempSrc: Temporal   SpO2: 98%   Weight: (!) 155 kg (341 lb 6.4 oz)   Height: 180.3 cm (70.98\")      Physical Exam  Vitals reviewed.   Constitutional:       General: She is not in acute distress.     Appearance: Normal appearance. She is obese.   HENT:      Head: Normocephalic and atraumatic.      Right Ear: Tympanic membrane, ear canal and external ear normal.      Left Ear: Tympanic membrane, ear canal and external ear normal.      Mouth/Throat:      Mouth: Mucous membranes are moist.      Pharynx: Oropharynx is clear. "   Eyes:      General: No scleral icterus.     Conjunctiva/sclera: Conjunctivae normal.   Cardiovascular:      Rate and Rhythm: Normal rate and regular rhythm.      Heart sounds: Normal heart sounds.   Pulmonary:      Effort: Pulmonary effort is normal. No respiratory distress.      Breath sounds: Normal breath sounds. No wheezing.   Musculoskeletal:      Cervical back: Neck supple.      Right lower leg: No edema.      Left lower leg: No edema.   Lymphadenopathy:      Cervical: No cervical adenopathy.   Skin:     General: Skin is warm and dry.   Neurological:      Mental Status: She is alert and oriented to person, place, and time.   Psychiatric:         Mood and Affect: Mood normal.         Behavior: Behavior normal.        Result Review :   The following data was reviewed by: PATRICIA Jasso on 03/20/2024:  A1C Last 3 Results          6/30/2023    11:16 3/20/2024    14:08   HGBA1C Last 3 Results   Hemoglobin A1C 7.6  6.3               PHQ-2 Depression Screening  Little interest or pleasure in doing things? 0-->not at all   Feeling down, depressed, or hopeless? 0-->not at all   PHQ-2 Total Score 0          Assessment and Plan    Diagnoses and all orders for this visit:    1. Type 2 diabetes mellitus without complication, without long-term current use of insulin (Primary)  Overview:  Hgba1c  7.6 % on 6/30/2023  Hgba1c 6.3 % on 3/20/2024    Assessment & Plan:  Chronic problem.  Improved.  Continue metformin  mg BID and glipizide 5 mg daily.  Will request note from [facility] about diabetic eye exam.    Orders:  -     POC Glycosylated Hemoglobin (Hb A1C)  -     Ambulatory Referral for Diabetic Eye Exam-Optometry    2. Essential (primary) hypertension  Assessment & Plan:  Chronic stable problem.  Continue Lisinopril 20 mg daily and HCTZ 25 mg daily      3. Mixed hyperlipidemia  Assessment & Plan:  Chronic stable problem.  Continue atorvastatin 10 mg daily.  Recheck lipid panel in July 2024.      4.  Gastroesophageal reflux disease without esophagitis  Assessment & Plan:  Chronic stable problem.  Continue omeprazole 20 mg daily as needed      5. Colon cancer screening  -     Ambulatory Referral to Colorectal Surgery             Follow Up   Return in about 6 months (around 9/20/2024) for Annual physical and follow up diabetes.  Patient was given instructions and counseling regarding her condition or for health maintenance advice. Please see specific information pulled into the AVS if appropriate.    There are no Patient Instructions on file for this visit.

## 2024-03-21 DIAGNOSIS — E11.9 TYPE 2 DIABETES MELLITUS WITHOUT COMPLICATION, WITHOUT LONG-TERM CURRENT USE OF INSULIN: ICD-10-CM

## 2024-03-21 DIAGNOSIS — I10 ESSENTIAL (PRIMARY) HYPERTENSION: ICD-10-CM

## 2024-03-21 DIAGNOSIS — M25.561 ARTHRALGIA OF RIGHT KNEE: ICD-10-CM

## 2024-03-21 RX ORDER — MELOXICAM 15 MG/1
15 TABLET ORAL DAILY
Qty: 90 TABLET | Refills: 0 | Status: CANCELLED | OUTPATIENT
Start: 2024-03-21

## 2024-03-21 RX ORDER — HYDROCHLOROTHIAZIDE 25 MG/1
25 TABLET ORAL DAILY
Qty: 90 TABLET | Refills: 0 | Status: CANCELLED | OUTPATIENT
Start: 2024-03-21

## 2024-03-22 DIAGNOSIS — M25.561 ARTHRALGIA OF RIGHT KNEE: ICD-10-CM

## 2024-03-22 DIAGNOSIS — I10 ESSENTIAL (PRIMARY) HYPERTENSION: ICD-10-CM

## 2024-03-22 RX ORDER — GLIPIZIDE 5 MG/1
5 TABLET, FILM COATED, EXTENDED RELEASE ORAL DAILY
Qty: 90 TABLET | Refills: 1 | Status: SHIPPED | OUTPATIENT
Start: 2024-03-22

## 2024-03-22 RX ORDER — METFORMIN HYDROCHLORIDE 500 MG/1
500 TABLET, EXTENDED RELEASE ORAL 2 TIMES DAILY
Qty: 180 TABLET | Refills: 2 | Status: SHIPPED | OUTPATIENT
Start: 2024-03-22

## 2024-03-22 RX ORDER — MELOXICAM 15 MG/1
15 TABLET ORAL DAILY
Qty: 90 TABLET | Refills: 1 | Status: SHIPPED | OUTPATIENT
Start: 2024-03-22

## 2024-03-22 RX ORDER — LISINOPRIL 20 MG/1
20 TABLET ORAL DAILY
Qty: 90 TABLET | Refills: 1 | Status: SHIPPED | OUTPATIENT
Start: 2024-03-22

## 2024-03-22 RX ORDER — HYDROCHLOROTHIAZIDE 25 MG/1
25 TABLET ORAL DAILY
Qty: 90 TABLET | Refills: 1 | Status: SHIPPED | OUTPATIENT
Start: 2024-03-22

## 2024-04-16 PROBLEM — Z12.11 ENCOUNTER FOR SCREENING COLONOSCOPY: Status: ACTIVE | Noted: 2024-03-21

## 2024-05-20 ENCOUNTER — ANESTHESIA EVENT (OUTPATIENT)
Dept: GASTROENTEROLOGY | Facility: HOSPITAL | Age: 59
End: 2024-05-20
Payer: COMMERCIAL

## 2024-05-20 ENCOUNTER — HOSPITAL ENCOUNTER (OUTPATIENT)
Facility: HOSPITAL | Age: 59
Setting detail: HOSPITAL OUTPATIENT SURGERY
Discharge: HOME | End: 2024-05-20
Attending: STUDENT IN AN ORGANIZED HEALTH CARE EDUCATION/TRAINING PROGRAM | Admitting: STUDENT IN AN ORGANIZED HEALTH CARE EDUCATION/TRAINING PROGRAM
Payer: COMMERCIAL

## 2024-05-20 ENCOUNTER — ANESTHESIA (OUTPATIENT)
Dept: GASTROENTEROLOGY | Facility: HOSPITAL | Age: 59
End: 2024-05-20
Payer: COMMERCIAL

## 2024-05-20 VITALS
SYSTOLIC BLOOD PRESSURE: 133 MMHG | HEIGHT: 71 IN | HEART RATE: 67 BPM | DIASTOLIC BLOOD PRESSURE: 70 MMHG | TEMPERATURE: 98.2 F | WEIGHT: 293 LBS | OXYGEN SATURATION: 98 % | RESPIRATION RATE: 12 BRPM | BODY MASS INDEX: 41.02 KG/M2

## 2024-05-20 DIAGNOSIS — Z12.11 ENCOUNTER FOR SCREENING COLONOSCOPY: ICD-10-CM

## 2024-05-20 LAB — GLUCOSE BLDC GLUCOMTR-MCNC: 153 MG/DL (ref 70–105)

## 2024-05-20 PROCEDURE — 45385 COLONOSCOPY W/LESION REMOVAL: CPT | Performed by: STUDENT IN AN ORGANIZED HEALTH CARE EDUCATION/TRAINING PROGRAM

## 2024-05-20 PROCEDURE — 82948 REAGENT STRIP/BLOOD GLUCOSE: CPT

## 2024-05-20 PROCEDURE — 25010000002 PROPOFOL 200 MG/20ML EMULSION: Performed by: NURSE ANESTHETIST, CERTIFIED REGISTERED

## 2024-05-20 PROCEDURE — 45380 COLONOSCOPY AND BIOPSY: CPT | Performed by: STUDENT IN AN ORGANIZED HEALTH CARE EDUCATION/TRAINING PROGRAM

## 2024-05-20 PROCEDURE — 88305 TISSUE EXAM BY PATHOLOGIST: CPT | Performed by: STUDENT IN AN ORGANIZED HEALTH CARE EDUCATION/TRAINING PROGRAM

## 2024-05-20 PROCEDURE — 25810000003 SODIUM CHLORIDE 0.9 % SOLUTION: Performed by: NURSE ANESTHETIST, CERTIFIED REGISTERED

## 2024-05-20 RX ORDER — PROPOFOL 10 MG/ML
INJECTION, EMULSION INTRAVENOUS AS NEEDED
Status: DISCONTINUED | OUTPATIENT
Start: 2024-05-20 | End: 2024-05-20 | Stop reason: SURG

## 2024-05-20 RX ORDER — SODIUM CHLORIDE, SODIUM LACTATE, POTASSIUM CHLORIDE, CALCIUM CHLORIDE 600; 310; 30; 20 MG/100ML; MG/100ML; MG/100ML; MG/100ML
30 INJECTION, SOLUTION INTRAVENOUS CONTINUOUS
Status: DISCONTINUED | OUTPATIENT
Start: 2024-05-20 | End: 2024-05-20 | Stop reason: HOSPADM

## 2024-05-20 RX ORDER — SODIUM CHLORIDE 9 MG/ML
INJECTION, SOLUTION INTRAVENOUS CONTINUOUS PRN
Status: DISCONTINUED | OUTPATIENT
Start: 2024-05-20 | End: 2024-05-20 | Stop reason: SURG

## 2024-05-20 RX ORDER — LIDOCAINE HYDROCHLORIDE 20 MG/ML
INJECTION, SOLUTION INFILTRATION; PERINEURAL AS NEEDED
Status: DISCONTINUED | OUTPATIENT
Start: 2024-05-20 | End: 2024-05-20 | Stop reason: SURG

## 2024-05-20 RX ADMIN — PROPOFOL 50 MG: 10 INJECTION, EMULSION INTRAVENOUS at 10:06

## 2024-05-20 RX ADMIN — SODIUM CHLORIDE: 9 INJECTION, SOLUTION INTRAVENOUS at 10:02

## 2024-05-20 RX ADMIN — PROPOFOL 50 MG: 10 INJECTION, EMULSION INTRAVENOUS at 10:10

## 2024-05-20 RX ADMIN — PROPOFOL 50 MG: 10 INJECTION, EMULSION INTRAVENOUS at 10:15

## 2024-05-20 RX ADMIN — PROPOFOL 100 MG: 10 INJECTION, EMULSION INTRAVENOUS at 10:05

## 2024-05-20 RX ADMIN — PROPOFOL 50 MG: 10 INJECTION, EMULSION INTRAVENOUS at 10:19

## 2024-05-20 RX ADMIN — PROPOFOL 50 MG: 10 INJECTION, EMULSION INTRAVENOUS at 10:18

## 2024-05-20 RX ADMIN — PROPOFOL 50 MG: 10 INJECTION, EMULSION INTRAVENOUS at 10:13

## 2024-05-20 RX ADMIN — LIDOCAINE HYDROCHLORIDE 100 MG: 20 INJECTION, SOLUTION INFILTRATION; PERINEURAL at 10:02

## 2024-05-20 RX ADMIN — PROPOFOL 50 MG: 10 INJECTION, EMULSION INTRAVENOUS at 10:11

## 2024-05-20 RX ADMIN — PROPOFOL 100 MG: 10 INJECTION, EMULSION INTRAVENOUS at 10:02

## 2024-05-20 NOTE — ANESTHESIA POSTPROCEDURE EVALUATION
Patient: Sandra Lopez    Procedure Summary       Date: 05/20/24 Room / Location: [facility] ENDOSCOPY 4 / [facility]    Anesthesia Start: 0958 Anesthesia Stop: 1027    Procedure: COLONOSCOPY with polypectomy x 3 (Rectum) Diagnosis:       Encounter for screening colonoscopy      (Encounter for screening colonoscopy [Z12.11])    Surgeons: Nick Jiménez MD Provider: Katty Valle MD    Anesthesia Type: general ASA Status: 3            Anesthesia Type: general    Vitals  Vitals Value Taken Time   /63 05/20/24 1042   Temp     Pulse 72 05/20/24 1042   Resp 23 05/20/24 1042   SpO2 98 % 05/20/24 1042           Post Anesthesia Care and Evaluation    Patient location during evaluation: PACU  Patient participation: complete - patient participated  Level of consciousness: awake  Pain scale: See nurse's notes for pain score.  Pain management: adequate    Airway patency: patent  Anesthetic complications: No anesthetic complications  PONV Status: none  Cardiovascular status: acceptable  Respiratory status: acceptable and spontaneous ventilation  Hydration status: acceptable    Comments: Patient seen and examined postoperatively; vital signs stable; SpO2 greater than or equal to 90%; cardiopulmonary status stable; nausea/vomiting adequately controlled; pain adequately controlled; no apparent anesthesia complications; patient discharged from anesthesia care when discharge criteria were met

## 2024-05-20 NOTE — H&P
Epic Template for Colonoscopy Scheduling Intake Note     Sandra Lopez is a 59 y.o. female who is referred by  @[email or web address] for colon cancer screening. Patient states she has   No bleeding with stools; she has no pain with bowel movements; she has no protrusion of tissue while straining.      Last colonoscopy: n/a  Family history of colon cancer: no  Blood thinners: Asprin  History of Cardiac Failure or Kidney Failure: no              Pre-Colonoscopy Assessment Questionnaire  1. Personal Information:   Name: Sandra Lopez   YOB: 1965   Contact Number: [phone]     2. Medical History:   a. Have you ever been diagnosed with any gastrointestinal conditions (e.g., Crohn's disease, ulcerative colitis)?   No   b. Do you have a history of bleeding disorders or blood clotting issues?   No   c. Have you undergone abdominal or pelvic surgeries in the past? If yes, please specify.   Yes:  abdominal hysterectomy   d. Have you ever been diagnosed with cardiac failure? If yes, please specify.   No.   e. Have you ever been diagnosed with kidney failure? If yes, please specify.   No.      3. Current Symptoms: a. Are you experiencing any of the following symptoms?  Rectal bleeding  No  Change in bowel habits  No  Severe constipation No  If yes, are home remedies working?n/a  Pain with bowel movement   No  Protrusion of tissue while straining  No  If yes, from a hemorrhoid?  No   or  is it bothering them?   No  Unexplained weight loss No  Other (please specify, example, Patient request to see provider, blood per rectum, recent abnormal CT, severe constipation with other treatment) No  ______________________________________________________________________________________________________________________  ______________________________________________________________________________________________________________________        4. Family History: a. Do you have a first-degree relative (parent, sibling, child)  who has been diagnosed with colorectal cancer?   No         5. Medications: a. Are you currently taking any medications, especially blood thinners or anticoagulants?   Yes.  Asprin      7. Previous Colonoscopies: a. Have you undergone a colonoscopy in the past? If yes, please provide the approximate date.    No     8. Other Health Concerns: a. Is there any other health condition or concern that you believe is important for the healthcare   provider to know before scheduling your colonoscopy? Please describe  pt is diabetic, is currently on blood pressure medication & Mobic 15mg  for knee pain.   ____________________________________________________________________________  ____________________________________________________________________________  ____________________________________________________________________________

## 2024-05-20 NOTE — OP NOTE
[facility] Colorectal Surgery  Colonoscopy Examination     Name: Sandra Lopez  : 1965  Date of Colonoscopy: 2024  Procedure: Average Risk Screening Colonoscopy  Surgeon: Nick Jiménez MD   Anesthesia: Sedation   IV Fluids: refer to anesthesia record    Procedure Details:    Prior to the procedure, history and physical exam was performed. Patient's medication and allergies were reviewed. The risk and benefits of the procedure and sedation options and risks were discussed with the patient. All questions were answered and informed consent was obtained.    The patient was brought to the endoscopy suite and placed in the left lateral decubitus position. Conscious sedation was administered by the anesthesia team. Vital signs including blood pressure, heart rate, and oxygen saturation were monitored continuously throughout the procedure.    The colonoscope was introduced through the rectum and advanced through the entire colon under direct visualization. The scope was maneuvered carefully, and the mucosa of the rectum, sigmoid colon, descending colon, transverse colon, ascending colon, and cecum were thoroughly inspected. Adequate insufflation was maintained throughout the procedure for optimal visualization.    Findings and interventions:    Rectum: Normal appearance. Two 5 mm polyps removed with cold biopsy forceps. One 7 mm semipedunculated removed with cold snare.    Sigmoid colon: Normal appearance with no lesions,polyps, or abnormalities.                                                     Descending colon: Normal appearance with no lesions,polyps, or abnormalities.  Transverse colon: Normal appearance with no lesions,polyps, or abnormalities.  Ascending colon: Normal appearance with no lesions,polyps, or abnormalities.  Cecum: Normal appearance with no lesions,polyps, or abnormalities.    Procedure Images:      Attached in a separate file.     Specimens:  The removed polyp were retrieved and sent for  histopathological examination to the pathology department for further analysis.    Recommendation:     Await for pathology   Repeat colonoscopy in 5 years     Conclusion:  The screening colonoscopy was completed successfully, and the entire colon was visualized without any complications. The patient tolerated the procedure well and was transferred to the recovery area in stable condition. Post-procedure instructions and follow-up were discussed with the patient and will be provided in written form.    Nick Jiménez MD  Colon and Rectal Surgery   [address], [address]  T: [phone]

## 2024-05-20 NOTE — ANESTHESIA PREPROCEDURE EVALUATION
Anesthesia Evaluation     Patient summary reviewed and Nursing notes reviewed   no history of anesthetic complications:   NPO Solid Status: > 8 hours  NPO Liquid Status: > 4 hours           Airway   Mallampati: II  TM distance: >3 FB  Neck ROM: full  Dental - normal exam     Pulmonary - normal exam   (-) not a smoker  Cardiovascular - normal exam    ECG reviewed    (+) hypertension, hyperlipidemia      Neuro/Psych- negative ROS  GI/Hepatic/Renal/Endo    (+) morbid obesity, GERD, diabetes mellitus type 2    Musculoskeletal     Abdominal    Substance History      OB/GYN          Other   arthritis,                   Anesthesia Plan    ASA 3     general   total IV anesthesia  intravenous induction     Anesthetic plan, risks, benefits, and alternatives have been provided, discussed and informed consent has been obtained with: patient.    Plan discussed with CRNA.    CODE STATUS:

## 2024-05-20 NOTE — DISCHARGE INSTRUCTIONS
A responsible adult should stay with you and you should rest quietly for the rest of the day.    Do not drink alcohol, drive, operate any heavy machinery or power tools or make any legal/important decisions for the next 24 hours.     Progress your diet as tolerated.  If you begin to experience severe pain, increased shortness of breath, racing heartbeat or a fever above 101 F, seek immediate medical attention.     Follow up with MD as instructed. Call office for results in 3 to 5 days if needed.     Dr Jiménez [phone]

## 2024-05-21 LAB
LAB AP CASE REPORT: NORMAL
LAB AP DIAGNOSIS COMMENT: NORMAL
PATH REPORT.FINAL DX SPEC: NORMAL
PATH REPORT.GROSS SPEC: NORMAL

## 2024-09-06 NOTE — TELEPHONE ENCOUNTER
[facility] EMERGENCY DEPT  EMERGENCY DEPARTMENT ENCOUNTER      Pt Name: Tyesha David Jr.  MRN: [id number]  Birthdate 10/1/1934  Date of evaluation: 9/6/2024  Provider: Antony Smiley MD    CHIEF COMPLAINT       Chief Complaint   Patient presents with    Wellness Check         HISTORY OF PRESENT ILLNESS   (Location/Symptom, Timing/Onset, Context/Setting, Quality, Duration, Modifying Factors, Severity)  Note limiting factors.   Tyesha David Jr. is a 89 y.o. male who presents to the emergency department with weakness and lethargy noted by staff and wife this morning.  The patient is currently resident of the local rehab center since his fall approximately 1 month ago when he suffered a clavicle fracture and nondisplaced pelvic fracture both of which he states are healing.  He has no new complaints of pain apparently his appetite is somewhat diminished he attributes this to the quality of food at the facility he is staying.  There is been no fever denies respiratory GI or  symptoms.  Spouse requests UA rule out UTI.  Patient is awake alert oriented x 4 coherent    HPI    Nursing Notes were reviewed.    REVIEW OF SYSTEMS    (2-9 systems for level 4, 10 or more for level 5)     Review of Systems   All other systems reviewed and are negative.      Except as noted above the remainder of the review of systems was reviewed and negative.       PAST MEDICAL HISTORY     Past Medical History:   Diagnosis Date    Arthritis     HTN (hypertension)          SURGICAL HISTORY     History reviewed. No pertinent surgical history.      CURRENT MEDICATIONS       Previous Medications    AMLODIPINE (NORVASC) 10 MG TABLET    amlodipine 10 mg tablet    APIXABAN (ELIQUIS) 5 MG TABS TABLET    Take 1 tablet by mouth 2 times daily    ASPIRIN 81 MG EC TABLET    Take 1 tablet by mouth daily    CALCIUM CARB-CHOLECALCIFEROL 600-5 MG-MCG TABS TABLET    Take 1 tablet by mouth daily    CEFDINIR (OMNICEF) 300 MG CAPSULE    cefdinir 300 mg capsule   TAKE ONE  Last seen 6/27/2019. Has no upcoming visits scheduled. Patient is diabetic with last a1c on 2/6/2018 of 12.6   Oral   SpO2: 99%           Medical Decision Making  Amount and/or Complexity of Data Reviewed  Labs: ordered.  Radiology: ordered.    Risk  Prescription drug management.    89-year-old male presents ED with generalized decline workup reveals UTI no evidence of sepsis normal white count normal vital signs.  This has been a recurrent problem treated successfully in the past with Levaquin given initial dose here and prescription for 1 week.  Mild dehydration recommend push oral fluids.  Instructed return if symptoms worsen        REASSESSMENT          CRITICAL CARE TIME   Total Critical Care time was      minutes, excluding separately reportable procedures.  There was a high probability of clinically significant/life threatening deterioration in the patient's condition which required my urgent intervention.   CONSULTS:  None    PROCEDURES:  Unless otherwise noted below, none     Procedures    FINAL IMPRESSION    No diagnosis found.      DISPOSITION/PLAN   DISPOSITION    Condition at Disposition: Data Unavailable      PATIENT REFERRED TO:  No follow-up provider specified.    DISCHARGE MEDICATIONS:  New Prescriptions    No medications on file     Controlled Substances Monitoring:          No data to display                (Please note that portions of this note were completed with a voice recognition program.  Efforts were made to edit the dictations but occasionally words are mis-transcribed.)    Antony Smiley MD (electronically signed)  Attending Emergency Physician           Antony Smiley MD  09/06/24 1449

## 2024-09-25 ENCOUNTER — TELEPHONE (OUTPATIENT)
Dept: FAMILY MEDICINE CLINIC | Facility: CLINIC | Age: 59
End: 2024-09-25

## 2024-09-25 DIAGNOSIS — I10 ESSENTIAL (PRIMARY) HYPERTENSION: ICD-10-CM

## 2024-09-25 DIAGNOSIS — M25.561 ARTHRALGIA OF RIGHT KNEE: ICD-10-CM

## 2024-09-25 DIAGNOSIS — E11.9 TYPE 2 DIABETES MELLITUS WITHOUT COMPLICATION, WITHOUT LONG-TERM CURRENT USE OF INSULIN: ICD-10-CM

## 2024-09-25 DIAGNOSIS — K21.9 GASTROESOPHAGEAL REFLUX DISEASE WITHOUT ESOPHAGITIS: ICD-10-CM

## 2024-09-25 DIAGNOSIS — E78.2 MIXED HYPERLIPIDEMIA: ICD-10-CM

## 2024-09-25 RX ORDER — LISINOPRIL 20 MG/1
20 TABLET ORAL DAILY
Qty: 90 TABLET | Refills: 1 | Status: CANCELLED | OUTPATIENT
Start: 2024-09-25

## 2024-09-25 RX ORDER — GLIPIZIDE 5 MG/1
5 TABLET, FILM COATED, EXTENDED RELEASE ORAL DAILY
Qty: 90 TABLET | Refills: 0 | Status: SHIPPED | OUTPATIENT
Start: 2024-09-25

## 2024-09-25 RX ORDER — HYDROCHLOROTHIAZIDE 25 MG/1
25 TABLET ORAL DAILY
Qty: 90 TABLET | Refills: 0 | Status: SHIPPED | OUTPATIENT
Start: 2024-09-25

## 2024-09-25 RX ORDER — MELOXICAM 15 MG/1
15 TABLET ORAL DAILY
Qty: 90 TABLET | Refills: 1 | Status: CANCELLED | OUTPATIENT
Start: 2024-09-25

## 2024-09-25 RX ORDER — ATORVASTATIN CALCIUM 10 MG/1
10 TABLET, FILM COATED ORAL DAILY
Qty: 90 TABLET | Refills: 0 | Status: SHIPPED | OUTPATIENT
Start: 2024-09-25

## 2024-09-25 NOTE — TELEPHONE ENCOUNTER
Due to provider being out sick we had to reschedule.  She needed an appt after 4 and the soonest was 11/14/24.   patient will need refills UP to then

## 2024-09-26 DIAGNOSIS — I10 ESSENTIAL (PRIMARY) HYPERTENSION: ICD-10-CM

## 2024-09-26 DIAGNOSIS — M25.561 ARTHRALGIA OF RIGHT KNEE: ICD-10-CM

## 2024-09-27 RX ORDER — LISINOPRIL 20 MG/1
20 TABLET ORAL DAILY
Qty: 90 TABLET | Refills: 0 | Status: SHIPPED | OUTPATIENT
Start: 2024-09-27

## 2024-09-27 RX ORDER — MELOXICAM 15 MG/1
15 TABLET ORAL DAILY
Qty: 90 TABLET | Refills: 0 | Status: SHIPPED | OUTPATIENT
Start: 2024-09-27

## 2024-11-11 ENCOUNTER — E-VISIT (OUTPATIENT)
Dept: FAMILY MEDICINE CLINIC | Facility: TELEHEALTH | Age: 59
End: 2024-11-11
Payer: COMMERCIAL

## 2024-11-11 PROCEDURE — FABRICHEALTHVISIT: Performed by: NURSE PRACTITIONER

## 2024-11-12 NOTE — E-VISIT TREATED
Date: 2024 19:33:09  Clinician: Erendira Leger  Clinician NPI: [id number]  Patient: Sandra Lopez  Patient : 1965  Patient Address: [address] [place] [address] [place] [id number]  Patient Phone: [phone]  Visit Protocol: UTI  Patient Summary:  Sandra is a 59 year old ( : 1965 ) female who initiated a visit for a presumed bladder infection.    The symptoms started 1-3 days ago and consist of feeling as if the bladder is never empty, urgency, urinary frequency, dysuria,   and urinary incontinence.   Symptom details   Urine color: Yellow    Denied symptoms include vomiting, vaginal itching, abdominal pain, vaginal discharge, foul-smelling urine, nausea, and chills. Sandra denies flank pain. Sandra does not feel feverish.     Sandra has used over-the-counter medications or home remedies to relieve the current symptoms.  Precipitating events  Sandra denies having a sexually transmitted disease.  Pertinent medical history  Sandra has had a bladder infection before and has had 1 in   the past 12 months. The most recent bladder infection was not within the last 4 weeks. The current symptoms are similar to previous bladder infection symptoms.   Sulfamethoxazole-trimethoprim (Bactrim DS) and ciprofloxacin (Cipro) has been effective in   treating past bladder infections.   Sandra typically gets a yeast infection when taking antibiotics. Sandra has successfully used Diflucan to treat previous yeast infections. 2 doses of fluconazole (Diflucan) has typically been needed for symptoms to   resolve in the past.  Sandra has diabetes. A provider has told Sandra that their diabetes is well managed.   Sandra has not been prescribed antibiotics to prevent frequent or repeated bladder infections in the past. Sandra has not experienced problems or   side effects with any of the common antibiotics used to treat bladder infections.   Sandra has not had a procedure or surgery done to the urinary tract. Sandra has not been  diagnosed with advanced kidney disease.   Sandra has not used a catheter and denies   being a patient in a hospital or nursing home in the past 2 weeks. Immunosuppressive conditions (e.g., chemotherapy, HIV, organ transplant, long-term use of steroids or other immunosuppressive medications, splenectomy) were denied.   Sandra does not smoke   or use smokeless tobacco. Sandra does not vape or use other e-cigarette products.     MEDICATIONS: fluconazole oral, sodium, potassium, and magnesium sulfates oral, epinephrine injection, aspirin oral, lisinopril oral, alcohol swabs topical, hydrochlorothiazide oral, cetirizine oral, varicella-zoster glycoprotein E vaccine,vial 2   intramuscular, metformin oral, omeprazole oral, glipizide oral, meloxicam oral, ALLERGIES: aspartame, prochlorperazine  Clinician Response:  Dear Sandra,  Based on the information you have provided, you have an acute urinary tract infection, also called a bladder infection. Bladder infections occur when bacteria from the outside of the body enters the urinary tract. Any   part of the urinary system can be infected, but the bladder is the most common.  Medication information  I am prescribing:       Nitrofurantoin monohyd/m-cryst (Macrobid) 100 mg oral capsule. Take 1 capsule by mouth every 12 hours for 5 days. Take this medication with food. There are no refills with this prescription.      Phenazopyridine (Pyridium) 200 mg oral tablet. Take 1 tablet by mouth 3 times per day for up to 2 days as needed. Take the tablets with a full glass of water after a meal. There are no refills with this prescription. This medication may also be purchased   over-the-counter.     The medication I prescribed for your bladder infection is an antibiotic. Continue taking the medication until it is gone even if you feel better.   Because you usually get a yeast infection when taking antibiotics, I am also prescribing:     Fluconazole (Diflucan) 150 mg oral tablet. Take 1  tablet by mouth in a single dose. Repeat dose in 3 days if symptoms are still present. There are no refills with this prescription.   Self care  Urination helps to flush bacteria from the urinary tract.   For this reason, drinking water and urinating often helps relieve some urinary symptoms and can decrease your risk of getting bladder infections in the future.  Other steps you can take to prevent future bladder infections include:     Wipe front to back after using the bathroom    Urinate after sexual intercourse    Avoid using deodorant sprays, douches, or powders in the vaginal area     When to seek care  Please make an appointment to be seen in a clinic or urgent care if any of the following occur:     You develop new symptoms or your symptoms become worse    You have medication side effects that make it difficult to take them as prescribed    Your symptoms do not improve within 1-2 days of starting treatment    You have symptoms of a bladder infection that return shortly after completing treatment     It is possible to have an allergic reaction to an antibiotic even if you have not had one in the past. If you notice a new rash, significant swelling, or difficulty breathing, stop taking this medication immediately and go to a clinic or urgent care.   Diagnosis: Acute uncomplicated bladder infection  Diagnosis ICD: N39.0    Follow up instructions: ATTENTION: If you have been prescribed medications, your prescriptions will not be sent until you choose your pharmacy.  To do so open the link within your notification, or go to [employer] and click eVisit in the menu to open your   treatment plan. From there, you can select your pharmacy at the bottom of your after visit summary. You can also go to [email or web address]  Additional Clinician Notes:  Due to you taking Metformin I cannot prescribe Bactrim or Cipro due to it lowering your blood sugar too much.   Prescriptions  Prescription:  nitrofurantoin monohyd/m-cryst (Macrobid) 100 mg oral capsule, take 1 capsule by mouth every 12 hours for 5 days  Sent To: [facility] #[id number] - [id number] - [address] [place] [address], [place] [id number]  Prescription: phenazopyridine (Pyridium) 200 mg oral tablet, take 1 tablet by mouth 3 times per day for up to 2 days as needed  Sent To: [facility] #[id number] - [id number] - [address] [place] [address], [place] [id number]  Prescription: fluconazole (Diflucan) 150 mg oral tablet, take 1 tablet by mouth in a single dose, repeat dose in 3 days if symptoms are still present  Sent To: [facility] #[id number] - [id number] - [address] [place] [address], [place] [id number]

## 2024-11-14 ENCOUNTER — OFFICE VISIT (OUTPATIENT)
Dept: FAMILY MEDICINE CLINIC | Facility: CLINIC | Age: 59
End: 2024-11-14
Payer: COMMERCIAL

## 2024-11-14 VITALS
SYSTOLIC BLOOD PRESSURE: 134 MMHG | HEIGHT: 71 IN | HEART RATE: 76 BPM | RESPIRATION RATE: 18 BRPM | OXYGEN SATURATION: 94 % | DIASTOLIC BLOOD PRESSURE: 86 MMHG | TEMPERATURE: 97.9 F | BODY MASS INDEX: 41.02 KG/M2 | WEIGHT: 293 LBS

## 2024-11-14 DIAGNOSIS — E11.9 TYPE 2 DIABETES MELLITUS WITHOUT COMPLICATION, WITHOUT LONG-TERM CURRENT USE OF INSULIN: ICD-10-CM

## 2024-11-14 DIAGNOSIS — E66.813 CLASS 3 SEVERE OBESITY DUE TO EXCESS CALORIES WITH SERIOUS COMORBIDITY AND BODY MASS INDEX (BMI) OF 50.0 TO 59.9 IN ADULT: ICD-10-CM

## 2024-11-14 DIAGNOSIS — K21.9 GASTROESOPHAGEAL REFLUX DISEASE WITHOUT ESOPHAGITIS: ICD-10-CM

## 2024-11-14 DIAGNOSIS — I10 ESSENTIAL (PRIMARY) HYPERTENSION: ICD-10-CM

## 2024-11-14 DIAGNOSIS — Z00.00 WELLNESS EXAMINATION: Primary | ICD-10-CM

## 2024-11-14 DIAGNOSIS — R31.9 HEMATURIA, UNSPECIFIED TYPE: ICD-10-CM

## 2024-11-14 DIAGNOSIS — R10.9 FLANK PAIN: ICD-10-CM

## 2024-11-14 DIAGNOSIS — E66.01 CLASS 3 SEVERE OBESITY DUE TO EXCESS CALORIES WITH SERIOUS COMORBIDITY AND BODY MASS INDEX (BMI) OF 50.0 TO 59.9 IN ADULT: ICD-10-CM

## 2024-11-14 DIAGNOSIS — E78.2 MIXED HYPERLIPIDEMIA: ICD-10-CM

## 2024-11-14 DIAGNOSIS — Z12.31 BREAST CANCER SCREENING BY MAMMOGRAM: ICD-10-CM

## 2024-11-14 LAB
EXPIRATION DATE: ABNORMAL
HBA1C MFR BLD: 7.6 % (ref 4.5–5.7)
Lab: ABNORMAL

## 2024-11-14 RX ORDER — CEFDINIR 300 MG/1
300 CAPSULE ORAL
COMMUNITY
Start: 2024-11-13 | End: 2024-11-18

## 2024-11-14 RX ORDER — PHENAZOPYRIDINE HYDROCHLORIDE 200 MG/1
200 TABLET, FILM COATED ORAL 3 TIMES DAILY PRN
COMMUNITY
Start: 2024-11-12

## 2024-11-14 RX ORDER — ATORVASTATIN CALCIUM 10 MG/1
10 TABLET, FILM COATED ORAL DAILY
Qty: 90 TABLET | Refills: 3 | Status: SHIPPED | OUTPATIENT
Start: 2024-11-14

## 2024-11-14 RX ORDER — FLUCONAZOLE 150 MG/1
150 TABLET ORAL DAILY
COMMUNITY
Start: 2024-11-11

## 2024-11-14 NOTE — PROGRESS NOTES
Chief Complaint  Annual Exam, Diabetes, Hypertension, and Hyperlipidemia    Jun Flores is a 59 y.o. female  who presents to [facility]     Patient Care Team:  Bhumika Dunaway APRN as PCP - General (Family Medicine)  Marlene Alvarez, RAYRAY as Ambulatory  (Population Health)     History of Present Illness  Adult Annual Wellness    Social History    Tobacco Use      Smoking status: Never      Smokeless tobacco: Never    Vaping Use      Vaping status: Never Used    Alcohol use: Not Currently    Drug use: Never    Caffeine use - soda, tea    General health habits  Last eye exam - Dec 2023  Last dental exam - she goes every 4 months    Diet - Regular diet    Weight / BMI - obese, BMI 51.8    Exercise - no    Hours of sleep per night ? 7- 8    Safety -   Do you use seat belts consistently ? Yes   Working smoke detector in home ? Yes   Do you use sunscreen when outdoors ? Yes       Reproductive health -  Sexually active - yes  Birth control - hysterectomy  History of abnormal pap smear ? No     Screening/prevention  Last mammogram/ breast cancer screening - 12/8/2023  Last pap smear/ cervical cancer screening - unsure  Last DEXA scan - Normal bone mineral density 12/31/2014  Colon cancer screening - colonoscopy 5/20/2024  CT low dose lung cancer screening -  never smoker    Immunizations -   Tdap - 6/30/2023  Pneumococcal vaccine - no  Shingles (Shingrix) - yes     ++++++++++++++++++++++++    Patient presents for follow-up of diabetes  This is an established problem  Interim treatment changes:  she states her diet has not been good, weight gain  Current medications: metformin  mg BID (she has been taking once daily) and glipizide 5 mg daily  Hgba1c 7.6 % on 11/14/2024  Checks blood sugar at home ? Yes daily in the AM  Last diabetic eye exam ? December 2023 at [facility] in [place]     ++++++++++++++++++++++++     Patient presents for follow-up of HTN  This is  an established problem  Interim treatment changes: none  Current medications: lisinopril 20 mg daily and HCTZ 25 mg daily  Checks blood pressure at home ? Yes      ++++++++++++++++++++++++     Patient presents for follow-up of hyperlipidemia  This is an established problem  Interim treatment changes: none  Current medications: atorvastatin 10 mg daily  Last lipid panel 7/11/2023     ++++++++++++++++++++++++      Patient presents for follow-up of GERD  This is an established problem  Interim treatment changes: none  Current medications: omeprazole 20 mg every 3 days  Controlled symptoms on medication every 3 days.    ++++++++++++++++++++++++    She had a virtual visit on 11/11/2024 and dx with UTI.  She was prescribed Macrobid and Pyridium  She went to the ER at [facility] on 11/13/2024 due to continued symptoms and severe back pain.  The pain resolved quickly while at the ER and she thinks she may have passed a kidney stone.  UA in ER showed 3+ blood and positive nitrites  She was prescribed Tramadol 50 mg every 6 hours as needed and cefdinir 300 mg BID for 5 days.  Preliminary urine culture done in the ER shows no growth      Sandra Lopez  has a past medical history of Allergic rhinitis, Diabetes mellitus, GERD (gastroesophageal reflux disease), Hyperlipidemia, Hypertension, and Obesity.      Review of Systems   Constitutional:  Positive for fatigue. Negative for fever.   HENT:  Negative for ear pain, sore throat and trouble swallowing.    Eyes:  Negative for visual disturbance.   Respiratory:  Negative for cough and shortness of breath.    Cardiovascular:  Positive for leg swelling. Negative for chest pain and palpitations.   Gastrointestinal:  Negative for blood in stool, constipation, diarrhea, nausea and vomiting.   Genitourinary:  Positive for hematuria (on UA).   Musculoskeletal:  Positive for arthralgias (right knee).   Skin:  Negative for rash.   Neurological:  Negative for dizziness and  headaches.   Psychiatric/Behavioral:  Negative for dysphoric mood. The patient is not nervous/anxious.         Family History   Problem Relation Age of Onset    Diabetes Mother     Hyperlipidemia Mother     Hypertension Mother     Hypertension Maternal Grandmother     Goiter Maternal Grandmother     Rheum arthritis Maternal Grandfather     Diabetes Paternal Grandmother     Breast cancer Paternal Grandmother     Aneurysm Paternal Grandfather     No Known Problems Daughter         Past Surgical History:   Procedure Laterality Date    BREAST BIOPSY Left     in [place]    COLONOSCOPY N/A 2024    Procedure: COLONOSCOPY with polypectomy x 3;  Surgeon: Nick Jiménez MD;  Location: [facility] ENDOSCOPY;  Service: General;  Laterality: N/A;  colon polyps x 3    HYSTERECTOMY      heavy bleeding    OOPHORECTOMY Bilateral     WISDOM TOOTH EXTRACTION          Social History     Socioeconomic History    Marital status:      Spouse name: Dex    Number of children: 1    Highest education level: Bachelor's degree (e.g., BA, AB, BS)   Tobacco Use    Smoking status: Never    Smokeless tobacco: Never   Vaping Use    Vaping status: Never Used   Substance and Sexual Activity    Alcohol use: Not Currently    Drug use: Never    Sexual activity: Yes     Birth control/protection: Hysterectomy        Immunization History   Administered Date(s) Administered    COVID-19 (PFIZER) 12YRS+ (COMIRNATY) 2024    COVID-19 (PFIZER) BIVALENT 12+YRS 10/03/2022    COVID-19 (PFIZER) Purple Cap Monovalent 2021, 2021, 10/04/2021    Covid-19 (Pfizer) Gray Cap Monovalent 2022    Flublok 18+yrs 10/02/2023    Fluzone  >6mos 10/07/2024    Hepatitis A 2018, 2018    Influenza, Unspecified 10/20/2018, 10/17/2019, 10/02/2020, 2022    Shingrix 2021, 2023    Tdap 2023       Menstrual History:  OB History          1    Para   1    Term   1            AB   0    Living   1          SAB        IAB        Ectopic   0    Molar        Multiple   0    Live Births   1               No LMP recorded. Patient has had a hysterectomy.        Objective       Current Outpatient Medications:     Alcohol Swabs (Alcohol Pads) 70 % pads, Use as directed, three times daily as needed., Disp: 200 each, Rfl: 5    aspirin 81 MG EC tablet, Take 1 tablet by mouth Daily., Disp: , Rfl:     atorvastatin (LIPITOR) 10 MG tablet, Take 1 tablet by mouth Daily., Disp: 90 tablet, Rfl: 3    cefdinir (OMNICEF) 300 MG capsule, Take 1 capsule by mouth., Disp: , Rfl:     cetirizine (zyrTEC) 10 MG tablet, Take 1 tablet by mouth Daily., Disp: , Rfl:     CINNAMON PO, Take 2,000 mg by mouth Daily., Disp: , Rfl:     Cranberry 500 MG capsule, Take 1,500 mg by mouth Daily., Disp: , Rfl:     EPINEPHrine (EpiPen 2-Louie) 0.3 MG/0.3ML solution auto-injector injection, Inject 0.3 mL into the appropriate muscle as directed by prescriber 1 (One) Time As Needed (allergic reaction) for up to 1 dose., Disp: 2 each, Rfl: 5    fluconazole (DIFLUCAN) 150 MG tablet, Take 1 tablet by mouth Daily., Disp: , Rfl:     glipizide (GLUCOTROL XL) 5 MG ER tablet, Take 1 tablet by mouth Daily., Disp: 90 tablet, Rfl: 0    glucose blood (FREESTYLE LITE) test strip, 1 each., Disp: , Rfl:     hydroCHLOROthiazide 25 MG tablet, Take 1 tablet by mouth Daily, Disp: 90 tablet, Rfl: 0    Lancets (FREESTYLE) lancets, Test 3 times daily as needed, Disp: 200 each, Rfl: 5    lisinopril (PRINIVIL,ZESTRIL) 20 MG tablet, Take 1 tablet by mouth Daily., Disp: 90 tablet, Rfl: 0    meloxicam (MOBIC) 15 MG tablet, Take 1 tablet by mouth Daily, Disp: 90 tablet, Rfl: 0    metFORMIN ER (GLUCOPHAGE-XR) 500 MG 24 hr tablet, Take 1 tablet by mouth 2 (Two) Times a Day., Disp: 180 tablet, Rfl: 2    Omega-3 Fatty Acids (fish oil) 1000 MG capsule capsule, Take 2 capsules by mouth Daily With Breakfast., Disp: , Rfl:     omeprazole (priLOSEC) 20 MG capsule, Take 1 capsule by mouth Daily.,  "Disp: 90 capsule, Rfl: 3    phenazopyridine (PYRIDIUM) 200 MG tablet, Take 1 tablet by mouth 3 (Three) Times a Day As Needed., Disp: , Rfl:     traMADol (ULTRAM) 50 MG tablet, Take 1 tablet by mouth Every 6 (Six) Hours As Needed (pain) for up to 3 days., Disp: 10 tablet, Rfl: 0    Vital Signs:      /86 (BP Location: Right arm, Patient Position: Sitting, Cuff Size: Large Adult)   Pulse 76   Temp 97.9 °F (36.6 °C) (Temporal)   Resp 18   Ht 180.3 cm (70.98\")   Wt (!) 168 kg (371 lb)   SpO2 94%   BMI 51.77 kg/m²     Vitals:    11/14/24 1623   BP: 134/86   BP Location: Right arm   Patient Position: Sitting   Cuff Size: Large Adult   Pulse: 76   Resp: 18   Temp: 97.9 °F (36.6 °C)   TempSrc: Temporal   SpO2: 94%   Weight: (!) 168 kg (371 lb)   Height: 180.3 cm (70.98\")   PainSc: 0-No pain      Physical Exam  Vitals reviewed.   Constitutional:       General: She is not in acute distress.     Appearance: Normal appearance. She is obese.   HENT:      Head: Normocephalic and atraumatic.      Right Ear: Tympanic membrane, ear canal and external ear normal.      Left Ear: Tympanic membrane, ear canal and external ear normal.      Mouth/Throat:      Mouth: Mucous membranes are moist.      Pharynx: Oropharynx is clear. No oropharyngeal exudate.   Eyes:      General: No scleral icterus.     Conjunctiva/sclera: Conjunctivae normal.   Neck:      Thyroid: No thyromegaly.   Cardiovascular:      Rate and Rhythm: Normal rate and regular rhythm.      Heart sounds: Normal heart sounds.   Pulmonary:      Effort: Pulmonary effort is normal. No respiratory distress.      Breath sounds: Normal breath sounds. No wheezing.   Abdominal:      Palpations: Abdomen is soft.      Tenderness: There is no abdominal tenderness. There is no right CVA tenderness or left CVA tenderness.   Musculoskeletal:      Cervical back: Neck supple.      Right lower leg: No edema.      Left lower leg: No edema.   Lymphadenopathy:      Cervical: No cervical " adenopathy.   Skin:     General: Skin is warm and dry.   Neurological:      Mental Status: She is alert and oriented to person, place, and time.   Psychiatric:         Mood and Affect: Mood normal.         Behavior: Behavior normal.        Result Review :   The following data was reviewed by: PATRICIA Jasso on 11/14/2024:  Common labs          3/20/2024    14:08 11/14/2024    16:36   Common Labs   Hemoglobin A1C 6.3  7.6      Data reviewed : Recent hospitalization notes [facility] emergency room note from 11/13/2024 and UA from ER visit          Urinalysis w/ Micro [facility] (UA w/ Micro [facility]) 11/12/24         Results  Most recent to oldest [Reference Range]: 1   UA Bacteria [None /HPF] None /HPF  (11/12/24 9:51 PM)   UA Bili [Negative] Negative  (11/12/24 9:51 PM)   UA Blood [Negative] 3+  *ABN*  (11/12/24 9:51 PM)   UA Color [Yellow] Dark-Yellow 1  (11/12/24 9:51 PM)   UA Glucose [Negative] Negative  (11/12/24 9:51 PM)   UA Ketones [Negative] Negative  (11/12/24 9:51 PM)   UA Leuk Est [Negative] Negative  (11/12/24 9:51 PM)   UA Mucous [None /LPF] 1+ Slight /LPF  (11/12/24 9:51 PM)   UA Nitrite [Negative] Positive  *ABN*  (11/12/24 9:51 PM)   UA Protein [Negative] Negative  (11/12/24 9:51 PM)   UA RBC [None /HPF]  /HPF  *ABN*  (11/12/24 9:51 PM)   UA Urobilinogen [<2 mg/dL] <2 mg/dL  (11/12/24 9:51 PM)   UA WBC [None /HPF] 6-10 /HPF  (11/12/24 9:51 PM)   UA pH [<=8.0] 6.0  (11/12/24 9:51 PM)   UA Spec Grav [1.005-1.030] 1.008  (11/12/24 9:51 PM)   UA Clarity [Clear] Clear  (11/12/24 9:51 PM)   UA Squam Epi [None /HPF] 1+ Slight /HPF  *ABN*  (11/12/24 9:51 PM)     Little interest or pleasure in doing things? Not at all   Feeling down, depressed, or hopeless? Not at all   PHQ-2 Total Score 0                 Assessment and Plan    Diagnoses and all orders for this visit:    1. Wellness examination (Primary)  Assessment & Plan:  Discussed preventative healthcare.  Labs ordered. Recommended annual eye and dental exams.  Recommended use of seatbelts, sunscreen and smoke detectors in the home.  Tdap up to date. Breast cancer screening (mammogram) ordered. Colon cancer screening (colonoscopy) up to date.     Orders:  -     CBC & Differential; Future  -     Comprehensive Metabolic Panel; Future  -     Lipid Panel; Future  -     TSH Rfx On Abnormal To Free T4; Future    2. Type 2 diabetes mellitus without complication, without long-term current use of insulin  Overview:  Hgba1c  7.6 % on 6/30/2023  Hgba1c 6.3 % on 3/20/2024  Hgba1c 7.6 % on 11/14/2024    Assessment & Plan:  Chronic problem.  Worsening.  Encouraged to take metformin  mg BID and continue glipizide 5 mg daily.  Make appointment to see eye doctor.  She will work on diet and exercise  Recheck in 3 months.    Orders:  -     POC Glycosylated Hemoglobin (Hb A1C)  -     Comprehensive Metabolic Panel; Future  -     Microalbumin / Creatinine Urine Ratio - Urine, Clean Catch; Future    3. Essential (primary) hypertension  Assessment & Plan:  Chronic stable problem.  Continue Lisinopril 20 mg daily and HCTZ 25 mg daily      4. Mixed hyperlipidemia  Assessment & Plan:  Chronic stable problem.  Continue atorvastatin 10 mg daily.    Recheck lipid panel    Orders:  -     Lipid Panel; Future  -     atorvastatin (LIPITOR) 10 MG tablet; Take 1 tablet by mouth Daily.  Dispense: 90 tablet; Refill: 3    5. Gastroesophageal reflux disease without esophagitis  Assessment & Plan:  Chronic stable problem.  Continue omeprazole 20 mg daily as needed (every 3 days)      6. Hematuria, unspecified type  Comments:  Recheck UA after completion of antibiotic  Orders:  -     Urinalysis With Microscopic - Urine, Clean Catch; Future    7. Flank pain  Comments:  May continue Tramadol for flank pain due to possible kidney stone pain  Orders:  -     traMADol (ULTRAM) 50 MG tablet; Take 1 tablet by mouth Every 6 (Six) Hours As Needed (pain) for up to 3 days.  Dispense: 10 tablet; Refill: 0    8. Class 3  severe obesity due to excess calories with serious comorbidity and body mass index (BMI) of 50.0 to 59.9 in adult  Assessment & Plan:  Patient's (Body mass index is 51.77 kg/m².) indicates that they are morbidly/severely obese (BMI > 40 or > 35 with obesity - related health condition) with health conditions that include hypertension, diabetes mellitus, dyslipidemias, and GERD . Weight is worsening. BMI  is above average; BMI management plan is completed. We discussed low calorie, low carb based diet program, portion control, and increasing exercise.       9. Breast cancer screening by mammogram  -     Mammo Screening Digital Tomosynthesis Bilateral With CAD           Follow Up   Return in about 3 months (around 2/14/2025) for diabetes.  Patient was given instructions and counseling regarding her condition or for health maintenance advice. Please see specific information pulled into the AVS if appropriate.    There are no Patient Instructions on file for this visit.

## 2024-11-15 RX ORDER — TRAMADOL HYDROCHLORIDE 50 MG/1
50 TABLET ORAL EVERY 6 HOURS PRN
Qty: 10 TABLET | Refills: 0 | Status: SHIPPED | OUTPATIENT
Start: 2024-11-15 | End: 2024-11-18

## 2024-11-15 RX ORDER — TRAMADOL HYDROCHLORIDE 50 MG/1
50 TABLET ORAL EVERY 6 HOURS PRN
COMMUNITY
Start: 2024-11-13 | End: 2024-11-15 | Stop reason: SDUPTHER

## 2024-11-17 NOTE — ASSESSMENT & PLAN NOTE
Patient's (Body mass index is 51.77 kg/m².) indicates that they are morbidly/severely obese (BMI > 40 or > 35 with obesity - related health condition) with health conditions that include hypertension, diabetes mellitus, dyslipidemias, and GERD . Weight is worsening. BMI  is above average; BMI management plan is completed. We discussed low calorie, low carb based diet program, portion control, and increasing exercise.

## 2024-11-17 NOTE — ASSESSMENT & PLAN NOTE
Discussed preventative healthcare.  Labs ordered. Recommended annual eye and dental exams. Recommended use of seatbelts, sunscreen and smoke detectors in the home.  Tdap up to date. Breast cancer screening (mammogram) ordered. Colon cancer screening (colonoscopy) up to date.

## 2024-11-17 NOTE — ASSESSMENT & PLAN NOTE
Chronic problem.  Worsening.  Encouraged to take metformin  mg BID and continue glipizide 5 mg daily.  Make appointment to see eye doctor.  She will work on diet and exercise  Recheck in 3 months.

## 2024-12-11 ENCOUNTER — HOSPITAL ENCOUNTER (OUTPATIENT)
Dept: MAMMOGRAPHY | Facility: HOSPITAL | Age: 59
Discharge: HOME | End: 2024-12-11
Admitting: NURSE PRACTITIONER
Payer: COMMERCIAL

## 2024-12-11 PROCEDURE — 77067 SCR MAMMO BI INCL CAD: CPT

## 2024-12-11 PROCEDURE — 77063 BREAST TOMOSYNTHESIS BI: CPT

## 2024-12-22 DIAGNOSIS — M25.561 ARTHRALGIA OF RIGHT KNEE: ICD-10-CM

## 2024-12-22 DIAGNOSIS — K21.9 GASTROESOPHAGEAL REFLUX DISEASE WITHOUT ESOPHAGITIS: ICD-10-CM

## 2024-12-22 DIAGNOSIS — I10 ESSENTIAL (PRIMARY) HYPERTENSION: ICD-10-CM

## 2024-12-22 DIAGNOSIS — E11.9 TYPE 2 DIABETES MELLITUS WITHOUT COMPLICATION, WITHOUT LONG-TERM CURRENT USE OF INSULIN: ICD-10-CM

## 2024-12-22 RX ORDER — GLIPIZIDE 5 MG/1
5 TABLET, FILM COATED, EXTENDED RELEASE ORAL DAILY
Qty: 90 TABLET | Refills: 0 | Status: SHIPPED | OUTPATIENT
Start: 2024-12-22

## 2024-12-22 RX ORDER — METFORMIN HYDROCHLORIDE 500 MG/1
500 TABLET, EXTENDED RELEASE ORAL 2 TIMES DAILY
Qty: 180 TABLET | Refills: 0 | Status: SHIPPED | OUTPATIENT
Start: 2024-12-22

## 2024-12-22 RX ORDER — LISINOPRIL 20 MG/1
20 TABLET ORAL DAILY
Qty: 90 TABLET | Refills: 0 | Status: SHIPPED | OUTPATIENT
Start: 2024-12-22

## 2024-12-23 RX ORDER — HYDROCHLOROTHIAZIDE 25 MG/1
25 TABLET ORAL DAILY
Qty: 90 TABLET | Refills: 0 | Status: SHIPPED | OUTPATIENT
Start: 2024-12-23

## 2024-12-23 RX ORDER — MELOXICAM 15 MG/1
15 TABLET ORAL DAILY
Qty: 90 TABLET | Refills: 0 | Status: SHIPPED | OUTPATIENT
Start: 2024-12-23

## 2024-12-23 NOTE — TELEPHONE ENCOUNTER
Called and spoke to pt.  Pt states she is going to check with insurance to see if she can get labs done at [facility]

## 2024-12-30 ENCOUNTER — TELEPHONE (OUTPATIENT)
Dept: FAMILY MEDICINE CLINIC | Facility: CLINIC | Age: 59
End: 2024-12-30
Payer: COMMERCIAL

## 2025-01-28 ENCOUNTER — LAB (OUTPATIENT)
Dept: LAB | Facility: HOSPITAL | Age: 60
End: 2025-01-28
Payer: COMMERCIAL

## 2025-01-28 DIAGNOSIS — E78.2 MIXED HYPERLIPIDEMIA: ICD-10-CM

## 2025-01-28 DIAGNOSIS — R31.9 HEMATURIA, UNSPECIFIED TYPE: ICD-10-CM

## 2025-01-28 DIAGNOSIS — E11.9 TYPE 2 DIABETES MELLITUS WITHOUT COMPLICATION, WITHOUT LONG-TERM CURRENT USE OF INSULIN: ICD-10-CM

## 2025-01-28 DIAGNOSIS — Z00.00 WELLNESS EXAMINATION: ICD-10-CM

## 2025-01-28 LAB
ALBUMIN SERPL-MCNC: 3.8 G/DL (ref 3.5–5.2)
ALBUMIN UR-MCNC: <1.2 MG/DL
ALBUMIN/GLOB SERPL: 1.2 G/DL
ALP SERPL-CCNC: 51 U/L (ref 39–117)
ALT SERPL W P-5'-P-CCNC: 36 U/L (ref 1–33)
ANION GAP SERPL CALCULATED.3IONS-SCNC: 11.6 MMOL/L (ref 5–15)
AST SERPL-CCNC: 28 U/L (ref 1–32)
BACTERIA UR QL AUTO: ABNORMAL /HPF
BASOPHILS # BLD AUTO: 0.02 10*3/MM3 (ref 0–0.2)
BASOPHILS NFR BLD AUTO: 0.3 % (ref 0–1.5)
BILIRUB SERPL-MCNC: 1.9 MG/DL (ref 0–1.2)
BILIRUB UR QL STRIP: NEGATIVE
BUN SERPL-MCNC: 17 MG/DL (ref 8–23)
BUN/CREAT SERPL: 23.3 (ref 7–25)
CALCIUM SPEC-SCNC: 10.4 MG/DL (ref 8.6–10.5)
CHLORIDE SERPL-SCNC: 101 MMOL/L (ref 98–107)
CHOLEST SERPL-MCNC: 175 MG/DL (ref 0–200)
CLARITY UR: CLEAR
CO2 SERPL-SCNC: 27.4 MMOL/L (ref 22–29)
COLOR UR: YELLOW
CREAT SERPL-MCNC: 0.73 MG/DL (ref 0.57–1)
CREAT UR-MCNC: 77.6 MG/DL
DEPRECATED RDW RBC AUTO: 45 FL (ref 37–54)
EGFRCR SERPLBLD CKD-EPI 2021: 94.3 ML/MIN/1.73
EOSINOPHIL # BLD AUTO: 0.19 10*3/MM3 (ref 0–0.4)
EOSINOPHIL NFR BLD AUTO: 2.7 % (ref 0.3–6.2)
ERYTHROCYTE [DISTWIDTH] IN BLOOD BY AUTOMATED COUNT: 13.2 % (ref 12.3–15.4)
GLOBULIN UR ELPH-MCNC: 3.1 GM/DL
GLUCOSE SERPL-MCNC: 192 MG/DL (ref 65–99)
GLUCOSE UR STRIP-MCNC: NEGATIVE MG/DL
HCT VFR BLD AUTO: 45.1 % (ref 34–46.6)
HDLC SERPL-MCNC: 57 MG/DL (ref 40–60)
HGB BLD-MCNC: 13.9 G/DL (ref 12–15.9)
HGB UR QL STRIP.AUTO: NEGATIVE
HYALINE CASTS UR QL AUTO: ABNORMAL /LPF
IMM GRANULOCYTES # BLD AUTO: 0.03 10*3/MM3 (ref 0–0.05)
IMM GRANULOCYTES NFR BLD AUTO: 0.4 % (ref 0–0.5)
KETONES UR QL STRIP: NEGATIVE
LDLC SERPL CALC-MCNC: 97 MG/DL (ref 0–100)
LDLC/HDLC SERPL: 1.65 {RATIO}
LEUKOCYTE ESTERASE UR QL STRIP.AUTO: ABNORMAL
LYMPHOCYTES # BLD AUTO: 3.83 10*3/MM3 (ref 0.7–3.1)
LYMPHOCYTES NFR BLD AUTO: 53.8 % (ref 19.6–45.3)
MCH RBC QN AUTO: 28.4 PG (ref 26.6–33)
MCHC RBC AUTO-ENTMCNC: 30.8 G/DL (ref 31.5–35.7)
MCV RBC AUTO: 92.2 FL (ref 79–97)
MICROALBUMIN/CREAT UR: NORMAL MG/G{CREAT}
MONOCYTES # BLD AUTO: 0.64 10*3/MM3 (ref 0.1–0.9)
MONOCYTES NFR BLD AUTO: 9 % (ref 5–12)
NEUTROPHILS NFR BLD AUTO: 2.41 10*3/MM3 (ref 1.7–7)
NEUTROPHILS NFR BLD AUTO: 33.8 % (ref 42.7–76)
NITRITE UR QL STRIP: NEGATIVE
NRBC BLD AUTO-RTO: 0 /100 WBC (ref 0–0.2)
PH UR STRIP.AUTO: 6 [PH] (ref 5–8)
PLATELET # BLD AUTO: 176 10*3/MM3 (ref 140–450)
PMV BLD AUTO: 11 FL (ref 6–12)
POTASSIUM SERPL-SCNC: 4.4 MMOL/L (ref 3.5–5.2)
PROT SERPL-MCNC: 6.9 G/DL (ref 6–8.5)
PROT UR QL STRIP: NEGATIVE
RBC # BLD AUTO: 4.89 10*6/MM3 (ref 3.77–5.28)
RBC # UR STRIP: ABNORMAL /HPF
REF LAB TEST METHOD: ABNORMAL
SODIUM SERPL-SCNC: 140 MMOL/L (ref 136–145)
SP GR UR STRIP: 1.01 (ref 1–1.03)
SQUAMOUS #/AREA URNS HPF: ABNORMAL /HPF
TRIGL SERPL-MCNC: 121 MG/DL (ref 0–150)
TSH SERPL DL<=0.05 MIU/L-ACNC: 2 UIU/ML (ref 0.27–4.2)
UROBILINOGEN UR QL STRIP: ABNORMAL
VLDLC SERPL-MCNC: 21 MG/DL (ref 5–40)
WBC # UR STRIP: ABNORMAL /HPF
WBC NRBC COR # BLD AUTO: 7.12 10*3/MM3 (ref 3.4–10.8)

## 2025-01-28 PROCEDURE — 82570 ASSAY OF URINE CREATININE: CPT

## 2025-01-28 PROCEDURE — 82043 UR ALBUMIN QUANTITATIVE: CPT

## 2025-01-28 PROCEDURE — 81001 URINALYSIS AUTO W/SCOPE: CPT

## 2025-01-28 PROCEDURE — 80061 LIPID PANEL: CPT

## 2025-01-28 PROCEDURE — 80050 GENERAL HEALTH PANEL: CPT

## 2025-01-28 NOTE — PROGRESS NOTES
Let her know:    1. Urinalysis - trace leukocytes.  No bacteria on microscopy.    2. CMP - blood sugar 192  kidney function and electrolytes are normal.  ALT - slightly elevated but stable  Total bilirubin elevated but stable    3. CBC - neutrophil % and lymphocyte % abnormal.  She has had this on prior CBCs from 2016 to 2022.  Recheck CBC in 3-6 months.    4. Microalbumin/creatinine urine ratio is normal.    5. TSH - thyroid function is normal.     6. Lipid panel - lipid panel (cholesterol) is normal. Continue atorvastatin 10 mg daily. Recheck lipid panel in 1 year.

## 2025-03-22 DIAGNOSIS — E11.9 TYPE 2 DIABETES MELLITUS WITHOUT COMPLICATION, WITHOUT LONG-TERM CURRENT USE OF INSULIN: ICD-10-CM

## 2025-03-22 DIAGNOSIS — I10 ESSENTIAL (PRIMARY) HYPERTENSION: ICD-10-CM

## 2025-03-22 DIAGNOSIS — M25.561 ARTHRALGIA OF RIGHT KNEE: ICD-10-CM

## 2025-03-22 DIAGNOSIS — K21.9 GASTROESOPHAGEAL REFLUX DISEASE WITHOUT ESOPHAGITIS: ICD-10-CM

## 2025-03-22 RX ORDER — LISINOPRIL 20 MG/1
20 TABLET ORAL DAILY
Qty: 90 TABLET | Refills: 0 | Status: SHIPPED | OUTPATIENT
Start: 2025-03-22

## 2025-03-22 RX ORDER — METFORMIN HYDROCHLORIDE 500 MG/1
500 TABLET, EXTENDED RELEASE ORAL 2 TIMES DAILY
Qty: 180 TABLET | Refills: 0 | Status: SHIPPED | OUTPATIENT
Start: 2025-03-22

## 2025-03-22 RX ORDER — GLIPIZIDE 5 MG/1
5 TABLET, FILM COATED, EXTENDED RELEASE ORAL DAILY
Qty: 90 TABLET | Refills: 0 | Status: SHIPPED | OUTPATIENT
Start: 2025-03-22

## 2025-03-25 RX ORDER — HYDROCHLOROTHIAZIDE 25 MG/1
25 TABLET ORAL DAILY
Qty: 90 TABLET | Refills: 0 | Status: SHIPPED | OUTPATIENT
Start: 2025-03-25

## 2025-03-25 RX ORDER — OMEPRAZOLE 20 MG/1
20 CAPSULE, DELAYED RELEASE ORAL DAILY
Qty: 90 CAPSULE | Refills: 0 | Status: SHIPPED | OUTPATIENT
Start: 2025-03-25

## 2025-03-25 RX ORDER — MELOXICAM 15 MG/1
15 TABLET ORAL DAILY
Qty: 90 TABLET | Refills: 0 | Status: SHIPPED | OUTPATIENT
Start: 2025-03-25

## 2025-04-24 ENCOUNTER — OFFICE VISIT (OUTPATIENT)
Dept: FAMILY MEDICINE CLINIC | Facility: CLINIC | Age: 60
End: 2025-04-24
Payer: COMMERCIAL

## 2025-04-24 DIAGNOSIS — E66.01 CLASS 3 SEVERE OBESITY DUE TO EXCESS CALORIES WITH SERIOUS COMORBIDITY AND BODY MASS INDEX (BMI) OF 50.0 TO 59.9 IN ADULT: ICD-10-CM

## 2025-04-24 DIAGNOSIS — E66.813 CLASS 3 SEVERE OBESITY DUE TO EXCESS CALORIES WITH SERIOUS COMORBIDITY AND BODY MASS INDEX (BMI) OF 50.0 TO 59.9 IN ADULT: ICD-10-CM

## 2025-04-24 DIAGNOSIS — E11.9 TYPE 2 DIABETES MELLITUS WITHOUT COMPLICATION, WITHOUT LONG-TERM CURRENT USE OF INSULIN: Primary | ICD-10-CM

## 2025-04-24 DIAGNOSIS — I10 ESSENTIAL (PRIMARY) HYPERTENSION: ICD-10-CM

## 2025-04-24 DIAGNOSIS — K21.9 GASTROESOPHAGEAL REFLUX DISEASE WITHOUT ESOPHAGITIS: ICD-10-CM

## 2025-04-24 DIAGNOSIS — E78.2 MIXED HYPERLIPIDEMIA: ICD-10-CM

## 2025-04-24 NOTE — PROGRESS NOTES
Chief Complaint  Diabetes, Hypertension, and Hyperlipidemia    Jun Flores is a 60 y.o. female  who presents to [facility]     Patient Care Team:  Bhumika Dunaway APRN as PCP - General (Family Medicine)     History of Present Illness  Patient presents for follow-up of diabetes  This is an established problem  Interim treatment changes: none  Current medications: metformin  mg BID and glipizide 5 mg daily    Checks blood sugar at home ? Yes on occasion  Ranges  in the AM    Last diabetic eye exam ? December 2024 at [facility] in [place] - no retinopathy  Microalbumin/creatinine urine ratio ? 1/28/2025    Hgba1c 7.6 % on 11/14/2024     ++++++++++++++++++++++++     Patient presents for follow-up of HTN  This is an established problem  Interim treatment changes: none  Current medications: lisinopril 20 mg daily and HCTZ 25 mg daily  Checks blood pressure at home ? Yes   Reports home readings are to goal     ++++++++++++++++++++++++     Patient presents for follow-up of hyperlipidemia  This is an established problem  Interim treatment changes: none  Current medications: atorvastatin 10 mg daily  Last lipid panel 1/28/2025     ++++++++++++++++++++++++      Patient presents for follow-up of GERD  This is an established problem  Interim treatment changes: none  Current medications: omeprazole 20 mg every 3 days  Controlled symptoms on medication every 3 days.      Sandra Lopez  has a past medical history of Allergic rhinitis, Diabetes mellitus, GERD (gastroesophageal reflux disease), Hyperlipidemia, Hypertension, and Obesity.      Review of Systems   Constitutional:  Positive for fatigue. Negative for fever.   HENT:  Negative for ear pain, sore throat and trouble swallowing.    Eyes:  Negative for visual disturbance.   Respiratory:  Negative for cough and shortness of breath.    Cardiovascular:  Negative for chest pain, palpitations and leg swelling.    Gastrointestinal:  Negative for blood in stool, constipation, diarrhea, nausea and vomiting.   Musculoskeletal:  Positive for arthralgias (right knee).   Skin:  Negative for rash.   Neurological:  Negative for dizziness and headaches.   Psychiatric/Behavioral:  Negative for dysphoric mood. The patient is not nervous/anxious.         Family History   Problem Relation Age of Onset    Diabetes Mother     Hyperlipidemia Mother     Hypertension Mother     Hypertension Maternal Grandmother     Goiter Maternal Grandmother     Rheum arthritis Maternal Grandfather     Arthritis Maternal Grandfather     Diabetes Paternal Grandmother     Breast cancer Paternal Grandmother     Aneurysm Paternal Grandfather     No Known Problems Daughter         Past Surgical History:   Procedure Laterality Date    BREAST BIOPSY Left 2008    in [place]    COLONOSCOPY N/A 05/20/2024    Procedure: COLONOSCOPY with polypectomy x 3;  Surgeon: Nick Jiménez MD;  Location: [facility] ENDOSCOPY;  Service: General;  Laterality: N/A;  colon polyps x 3    HYSTERECTOMY  2008    heavy bleeding    OOPHORECTOMY Bilateral 2008    WISDOM TOOTH EXTRACTION          Social History     Socioeconomic History    Marital status:      Spouse name: Dex    Number of children: 1    Highest education level: Bachelor's degree (e.g., BA, AB, BS)   Tobacco Use    Smoking status: Never    Smokeless tobacco: Never   Vaping Use    Vaping status: Never Used   Substance and Sexual Activity    Alcohol use: Not Currently    Drug use: Never    Sexual activity: Yes     Birth control/protection: Hysterectomy        Immunization History   Administered Date(s) Administered    COVID-19 (PFIZER) 12YRS+ (COMIRNATY) 01/08/2024    COVID-19 (PFIZER) BIVALENT 12+YRS 10/03/2022    COVID-19 (PFIZER) Purple Cap Monovalent 01/04/2021, 01/25/2021, 10/04/2021    Covid-19 (Pfizer) Gray Cap Monovalent 08/02/2022    Flublok 18+yrs 10/02/2023    Fluzone  >6mos 10/07/2024    Hepatitis A  04/19/2018, 11/06/2018    Influenza, Unspecified 10/20/2018, 10/17/2019, 10/02/2020, 11/07/2022    Shingrix 06/28/2021, 07/05/2023    Tdap 06/30/2023       Objective       Current Outpatient Medications:     Alcohol Swabs (Alcohol Pads) 70 % pads, Use as directed, three times daily as needed., Disp: 200 each, Rfl: 5    aspirin 81 MG EC tablet, Take 1 tablet by mouth Daily., Disp: , Rfl:     atorvastatin (LIPITOR) 10 MG tablet, Take 1 tablet by mouth Daily., Disp: 90 tablet, Rfl: 3    cetirizine (zyrTEC) 10 MG tablet, Take 1 tablet by mouth Daily., Disp: , Rfl:     CINNAMON PO, Take 2,000 mg by mouth Daily., Disp: , Rfl:     Cranberry 500 MG capsule, Take 1,500 mg by mouth Daily., Disp: , Rfl:     EPINEPHrine (EpiPen 2-Louie) 0.3 MG/0.3ML solution auto-injector injection, Inject 0.3 mL into the appropriate muscle as directed by prescriber 1 (One) Time As Needed (allergic reaction) for up to 1 dose., Disp: 2 each, Rfl: 5    glipizide (GLUCOTROL XL) 5 MG ER tablet, Take 1 tablet by mouth Daily., Disp: 90 tablet, Rfl: 0    glucose blood (FREESTYLE LITE) test strip, 1 each., Disp: , Rfl:     hydroCHLOROthiazide 25 MG tablet, Take 1 tablet by mouth Daily., Disp: 90 tablet, Rfl: 0    Lancets (FREESTYLE) lancets, Test 3 times daily as needed, Disp: 200 each, Rfl: 5    lisinopril (PRINIVIL,ZESTRIL) 20 MG tablet, Take 1 tablet by mouth Daily., Disp: 90 tablet, Rfl: 0    meloxicam (MOBIC) 15 MG tablet, Take 1 tablet by mouth Daily., Disp: 90 tablet, Rfl: 0    metFORMIN ER (GLUCOPHAGE-XR) 500 MG 24 hr tablet, Take 1 tablet by mouth 2 (Two) Times a Day., Disp: 180 tablet, Rfl: 0    Omega-3 Fatty Acids (fish oil) 1000 MG capsule capsule, Take 2 capsules by mouth Daily With Breakfast., Disp: , Rfl:     omeprazole (priLOSEC) 20 MG capsule, Take 1 capsule by mouth Daily., Disp: 90 capsule, Rfl: 0    Semaglutide,0.25 or 0.5MG/DOS, (OZEMPIC) 2 MG/3ML solution pen-injector, Inject 0.25 mg under the skin into the appropriate area as  "directed 1 (One) Time Per Week., Disp: 9 mL, Rfl: 1    Vital Signs:      /82 (Patient Position: Sitting)   Pulse 72   Temp 97.3 °F (36.3 °C) (Infrared)   Resp 12   Ht 180.3 cm (70.98\")   Wt (!) 165 kg (363 lb 9.6 oz)   SpO2 99%   BMI 50.74 kg/m²     Vitals:    04/24/25 1648 04/24/25 1716   BP: 168/86 138/82   BP Location: Left arm    Patient Position: Sitting Sitting   Cuff Size: Large Adult    Pulse: 72    Resp: 12    Temp: 97.3 °F (36.3 °C)    TempSrc: Infrared    SpO2: 99%    Weight: (!) 165 kg (363 lb 9.6 oz)    Height: 180.3 cm (70.98\")       Physical Exam  Vitals reviewed.   Constitutional:       General: She is not in acute distress.     Appearance: Normal appearance. She is obese.   HENT:      Head: Normocephalic and atraumatic.      Right Ear: Tympanic membrane, ear canal and external ear normal.      Left Ear: Tympanic membrane, ear canal and external ear normal.      Mouth/Throat:      Mouth: Mucous membranes are moist.      Pharynx: Oropharynx is clear.   Eyes:      General: No scleral icterus.     Conjunctiva/sclera: Conjunctivae normal.   Cardiovascular:      Rate and Rhythm: Normal rate and regular rhythm.      Pulses:           Dorsalis pedis pulses are 2+ on the right side and 2+ on the left side.        Posterior tibial pulses are 2+ on the right side and 2+ on the left side.      Heart sounds: Normal heart sounds.   Pulmonary:      Effort: Pulmonary effort is normal. No respiratory distress.      Breath sounds: Normal breath sounds.   Musculoskeletal:      Cervical back: Neck supple.      Right lower leg: No edema.      Left lower leg: No edema.      Right foot: Normal range of motion. No deformity, bunion or Charcot foot.      Left foot: Normal range of motion. No deformity, bunion or Charcot foot.   Feet:      Right foot:      Protective Sensation: 10 sites tested.  10 sites sensed.      Skin integrity: Skin integrity normal. No erythema or warmth.      Toenail Condition: Right " toenails are normal.      Left foot:      Protective Sensation: 10 sites tested.  10 sites sensed.      Skin integrity: Skin integrity normal. No erythema or warmth.      Toenail Condition: Left toenails are normal.      Comments: Diabetic Foot Exam Performed and Monofilament Test Performed  Lymphadenopathy:      Cervical: No cervical adenopathy.   Skin:     General: Skin is warm and dry.   Neurological:      Mental Status: She is alert and oriented to person, place, and time.   Psychiatric:         Mood and Affect: Mood normal.         Behavior: Behavior normal.        Result Review :   The following data was reviewed by: PATRICIA Jasso on 04/24/2025:  Common labs          11/14/2024    16:36 1/28/2025    08:39 1/28/2025    10:11   Common Labs   Glucose  192     BUN  17     Creatinine  0.73     Sodium  140     Potassium  4.4     Chloride  101     Calcium  10.4     Albumin  3.8     Total Bilirubin  1.9     Alkaline Phosphatase  51     AST (SGOT)  28     ALT (SGPT)  36     WBC  7.12     Hemoglobin  13.9     Hematocrit  45.1     Platelets  176     Total Cholesterol  175     Triglycerides  121     HDL Cholesterol  57     LDL Cholesterol   97     Hemoglobin A1C 7.6      Microalbumin, Urine   <1.2      TSH          1/28/2025    08:39   TSH   TSH 2.000         Assessment and Plan    Diagnoses and all orders for this visit:    1. Type 2 diabetes mellitus without complication, without long-term current use of insulin (Primary)  Overview:  Hgba1c  7.6 % on 6/30/2023  Hgba1c 6.3 % on 3/20/2024  Hgba1c 7.6 % on 11/14/2024    Assessment & Plan:  Chronic problem.  Continue metformin  mg BID and glipizide 5 mg daily.  Begin Ozempic 0.25 mg weekly  Out of point of care hgba1c tests.  She will return for hgba1c next week.    Orders:  -     Semaglutide,0.25 or 0.5MG/DOS, (OZEMPIC) 2 MG/3ML solution pen-injector; Inject 0.25 mg under the skin into the appropriate area as directed 1 (One) Time Per Week.  Dispense: 9 mL;  Refill: 1    2. Essential (primary) hypertension  Assessment & Plan:  Chronic stable problem.  Continue Lisinopril 20 mg daily and HCTZ 25 mg daily      3. Mixed hyperlipidemia  Assessment & Plan:  Chronic stable problem.  Continue atorvastatin 10 mg daily.    Recheck lipid panel in January 2026      4. Gastroesophageal reflux disease without esophagitis  Assessment & Plan:  Chronic stable problem.  Continue omeprazole 20 mg daily as needed (every 3 days)      5. Class 3 severe obesity due to excess calories with serious comorbidity and body mass index (BMI) of 50.0 to 59.9 in adult  Assessment & Plan:  Patient's (Body mass index is 50.74 kg/m².) indicates that they are morbidly/severely obese (BMI > 40 or > 35 with obesity - related health condition) with health conditions that include hypertension, diabetes mellitus, and dyslipidemias . Weight is improving with lifestyle modifications. BMI  is above average; BMI management plan is completed. We discussed low calorie, low carb based diet program, portion control, and increasing exercise.                Follow Up   Return in about 3 months (around 7/24/2025) for diabetes.  Patient was given instructions and counseling regarding her condition or for health maintenance advice. Please see specific information pulled into the AVS if appropriate.    There are no Patient Instructions on file for this visit.

## 2025-04-26 VITALS
RESPIRATION RATE: 12 BRPM | WEIGHT: 293 LBS | HEART RATE: 72 BPM | SYSTOLIC BLOOD PRESSURE: 138 MMHG | TEMPERATURE: 97.3 F | DIASTOLIC BLOOD PRESSURE: 82 MMHG | HEIGHT: 71 IN | OXYGEN SATURATION: 99 % | BODY MASS INDEX: 41.02 KG/M2

## 2025-04-26 NOTE — ASSESSMENT & PLAN NOTE
Chronic problem.  Continue metformin  mg BID and glipizide 5 mg daily.  Begin Ozempic 0.25 mg weekly  Out of point of care hgba1c tests.  She will return for hgba1c next week.

## 2025-04-26 NOTE — ASSESSMENT & PLAN NOTE
Patient's (Body mass index is 50.74 kg/m².) indicates that they are morbidly/severely obese (BMI > 40 or > 35 with obesity - related health condition) with health conditions that include hypertension, diabetes mellitus, and dyslipidemias . Weight is improving with lifestyle modifications. BMI  is above average; BMI management plan is completed. We discussed low calorie, low carb based diet program, portion control, and increasing exercise.

## 2025-04-26 NOTE — ASSESSMENT & PLAN NOTE
Chronic stable problem.  Continue atorvastatin 10 mg daily.    Recheck lipid panel in January 2026

## 2025-05-02 ENCOUNTER — TELEPHONE (OUTPATIENT)
Dept: FAMILY MEDICINE CLINIC | Facility: CLINIC | Age: 60
End: 2025-05-02

## 2025-05-02 ENCOUNTER — LAB (OUTPATIENT)
Dept: FAMILY MEDICINE CLINIC | Facility: CLINIC | Age: 60
End: 2025-05-02
Payer: COMMERCIAL

## 2025-05-02 DIAGNOSIS — E11.65 TYPE 2 DIABETES MELLITUS WITH HYPERGLYCEMIA, UNSPECIFIED WHETHER LONG TERM INSULIN USE: Primary | ICD-10-CM

## 2025-05-02 LAB
EXPIRATION DATE: ABNORMAL
HBA1C MFR BLD: 7.4 % (ref 4.5–5.7)
Lab: ABNORMAL

## 2025-05-02 NOTE — TELEPHONE ENCOUNTER
Patient came in for A1c check due to the machine being down at her visit. A1c was 7.4% patient said that she is starting ozempic Sunday she said.

## 2025-06-20 DIAGNOSIS — E11.9 TYPE 2 DIABETES MELLITUS WITHOUT COMPLICATION, WITHOUT LONG-TERM CURRENT USE OF INSULIN: ICD-10-CM

## 2025-06-20 DIAGNOSIS — I10 ESSENTIAL (PRIMARY) HYPERTENSION: ICD-10-CM

## 2025-06-20 DIAGNOSIS — M25.561 ARTHRALGIA OF RIGHT KNEE: ICD-10-CM

## 2025-06-20 RX ORDER — GLIPIZIDE 5 MG/1
5 TABLET, FILM COATED, EXTENDED RELEASE ORAL DAILY
Qty: 90 TABLET | Refills: 0 | Status: SHIPPED | OUTPATIENT
Start: 2025-06-20

## 2025-06-20 RX ORDER — MELOXICAM 15 MG/1
15 TABLET ORAL DAILY
Qty: 90 TABLET | Refills: 1 | Status: SHIPPED | OUTPATIENT
Start: 2025-06-20

## 2025-06-20 RX ORDER — LISINOPRIL 20 MG/1
20 TABLET ORAL DAILY
Qty: 90 TABLET | Refills: 0 | Status: SHIPPED | OUTPATIENT
Start: 2025-06-20

## 2025-06-20 RX ORDER — METFORMIN HYDROCHLORIDE 500 MG/1
500 TABLET, EXTENDED RELEASE ORAL 2 TIMES DAILY
Qty: 180 TABLET | Refills: 0 | Status: SHIPPED | OUTPATIENT
Start: 2025-06-20

## 2025-06-20 RX ORDER — HYDROCHLOROTHIAZIDE 25 MG/1
25 TABLET ORAL DAILY
Qty: 90 TABLET | Refills: 1 | Status: SHIPPED | OUTPATIENT
Start: 2025-06-20

## 2025-07-07 ENCOUNTER — TELEPHONE (OUTPATIENT)
Dept: FAMILY MEDICINE CLINIC | Facility: CLINIC | Age: 60
End: 2025-07-07
Payer: COMMERCIAL

## 2025-07-07 NOTE — TELEPHONE ENCOUNTER
Caller: Sandra Lopez    Relationship to patient: Self    Best call back number: [phone]    Patient is needing:   PATIENT STATES SHE WENT TO THE URGENT CARE YESTERDAY AND SHE'S WANTING TO KNOW IF SHE COULD GET ANOTHER STEROID SHOT IN THE OFFICE. PATIENT STATES SHES STARTING TO ITCH AGAIN AND PREDNISONE AND BENADRYL ARE NOT HELPING.    PLEASE ADVISE.

## 2025-07-07 NOTE — TELEPHONE ENCOUNTER
Patient has been called and notified per Dr. Garcia this is not something we can do as it would not be safe for her to give her more steroids. The injection that was given is a long acting type and it should start kicking in for her and working here in the next couple days and on top of the oral steroids that she was given as well. She can do an anti-itch cream and see if this helps her as well as continue the benadryl.     She states that as well she has stopped taking her Ozempic as she stated's that this  is what she is having the rash and reacting to and she has elected to stop taking it.

## 2025-07-08 ENCOUNTER — PATIENT MESSAGE (OUTPATIENT)
Dept: FAMILY MEDICINE CLINIC | Facility: CLINIC | Age: 60
End: 2025-07-08
Payer: COMMERCIAL

## 2025-07-08 DIAGNOSIS — T78.40XA ALLERGIC REACTION, INITIAL ENCOUNTER: ICD-10-CM

## 2025-07-08 RX ORDER — EPINEPHRINE 0.3 MG/.3ML
0.3 INJECTION SUBCUTANEOUS ONCE AS NEEDED
Qty: 2 EACH | Refills: 5 | Status: SHIPPED | OUTPATIENT
Start: 2025-07-08

## (undated) DEVICE — SNAR POLYP HOTSNARE/BRAIDED OVL/MINI 7F 2.8X10MM 230CM 1P/U

## (undated) DEVICE — PK ENDO GI 50

## (undated) DEVICE — TRAP WIDEEYE POLYP

## (undated) DEVICE — SINGLE-USE BIOPSY FORCEPS: Brand: RADIAL JAW 4